# Patient Record
Sex: FEMALE | Race: WHITE | Employment: FULL TIME | URBAN - METROPOLITAN AREA
[De-identification: names, ages, dates, MRNs, and addresses within clinical notes are randomized per-mention and may not be internally consistent; named-entity substitution may affect disease eponyms.]

---

## 2020-02-17 ENCOUNTER — OFFICE VISIT (OUTPATIENT)
Dept: FAMILY MEDICINE CLINIC | Facility: CLINIC | Age: 30
End: 2020-02-17
Payer: COMMERCIAL

## 2020-02-17 VITALS
SYSTOLIC BLOOD PRESSURE: 117 MMHG | TEMPERATURE: 98 F | BODY MASS INDEX: 32.65 KG/M2 | DIASTOLIC BLOOD PRESSURE: 75 MMHG | HEART RATE: 83 BPM | RESPIRATION RATE: 16 BRPM | HEIGHT: 66.9 IN | WEIGHT: 208 LBS

## 2020-02-17 DIAGNOSIS — Z00.00 ROUTINE PHYSICAL EXAMINATION: Primary | ICD-10-CM

## 2020-02-17 PROCEDURE — 99385 PREV VISIT NEW AGE 18-39: CPT | Performed by: FAMILY MEDICINE

## 2020-02-17 NOTE — PROGRESS NOTES
HPI:    Patient ID: Ilda Ramirez is a 30year old female. Patient is here for routine physical exam. No acute issues. No significant chronic medical problems. Patient is requesting annual testing. Diet and exercise have been good.  Past medical hist content normal.              ASSESSMENT/PLAN:   Routine physical examination:  - Exam is unremarkable. Screening tests were discussed, and after discussion, will check lab work as below. Healthy diet, exercise, and weight were discussed.  To call if problem

## 2020-04-27 ENCOUNTER — TELEPHONE (OUTPATIENT)
Dept: FAMILY MEDICINE CLINIC | Facility: CLINIC | Age: 30
End: 2020-04-27

## 2020-04-27 NOTE — TELEPHONE ENCOUNTER
Please see patient's MyChart appointment below.     Appointment For: Madina Olmedo ([id number])   Visit Type: Timoteo uDbon (2068)      4/28/2020   10:50 AM  10 mins. Jodee Cortez MD        [facility]      Patient Comments:   I have what looks

## 2020-04-27 NOTE — TELEPHONE ENCOUNTER
Pt calling back to state her mom told her the cyst looks like there is something in it \"like a jacobo pimple looks\"  Advised to keep OV with Dr Mariia Chiu and pt agrees to plan

## 2020-04-27 NOTE — TELEPHONE ENCOUNTER
Spoke with pt who states has cyst on right shoulder blade for over a year.     Per pt this last week it has \"tripled in size and is red/painful\"    Pt states she had tele visit with outside provider last week who gave her keflex and told her to contact [facility]

## 2020-04-28 ENCOUNTER — OFFICE VISIT (OUTPATIENT)
Dept: FAMILY MEDICINE CLINIC | Facility: CLINIC | Age: 30
End: 2020-04-28
Payer: COMMERCIAL

## 2020-04-28 VITALS — BODY MASS INDEX: 31.71 KG/M2 | HEIGHT: 66.9 IN | TEMPERATURE: 98 F | WEIGHT: 202 LBS

## 2020-04-28 DIAGNOSIS — L72.3 SEBACEOUS CYST: Primary | ICD-10-CM

## 2020-04-28 PROCEDURE — 99213 OFFICE O/P EST LOW 20 MIN: CPT | Performed by: FAMILY MEDICINE

## 2020-04-28 RX ORDER — CEPHALEXIN 500 MG/1
CAPSULE ORAL
COMMUNITY
Start: 2020-04-23 | End: 2021-04-27

## 2020-04-28 NOTE — PROGRESS NOTES
HPI:    Patient ID: Vasu Bear is a 30year old female. Pt presents for follow up from telemedicine visit for cyst of the right posterior shoulder area. Was diagnosed with seb cyst. Patient is being treated with cephalexin.  Patient states symptom

## 2021-03-13 DIAGNOSIS — Z23 NEED FOR VACCINATION: ICD-10-CM

## 2021-04-27 ENCOUNTER — OFFICE VISIT (OUTPATIENT)
Dept: OBGYN CLINIC | Facility: CLINIC | Age: 31
End: 2021-04-27
Payer: COMMERCIAL

## 2021-04-27 VITALS — WEIGHT: 188 LBS | BODY MASS INDEX: 30 KG/M2 | SYSTOLIC BLOOD PRESSURE: 126 MMHG | DIASTOLIC BLOOD PRESSURE: 82 MMHG

## 2021-04-27 DIAGNOSIS — Z01.419 WELL WOMAN EXAM: Primary | ICD-10-CM

## 2021-04-27 DIAGNOSIS — N92.6 ENCOUNTER FOR MANAGEMENT OF MENSTRUAL ISSUE: ICD-10-CM

## 2021-04-27 DIAGNOSIS — Z30.09 COUNSELING FOR INITIATION OF BIRTH CONTROL METHOD: ICD-10-CM

## 2021-04-27 DIAGNOSIS — N89.8 VAGINAL DISCHARGE: ICD-10-CM

## 2021-04-27 PROCEDURE — 99385 PREV VISIT NEW AGE 18-39: CPT | Performed by: NURSE PRACTITIONER

## 2021-04-27 PROCEDURE — 87106 FUNGI IDENTIFICATION YEAST: CPT | Performed by: NURSE PRACTITIONER

## 2021-04-27 PROCEDURE — 3079F DIAST BP 80-89 MM HG: CPT | Performed by: NURSE PRACTITIONER

## 2021-04-27 PROCEDURE — 87808 TRICHOMONAS ASSAY W/OPTIC: CPT | Performed by: NURSE PRACTITIONER

## 2021-04-27 PROCEDURE — 3074F SYST BP LT 130 MM HG: CPT | Performed by: NURSE PRACTITIONER

## 2021-04-27 PROCEDURE — 87205 SMEAR GRAM STAIN: CPT | Performed by: NURSE PRACTITIONER

## 2021-04-27 RX ORDER — LEVONORGESTREL / ETHINYL ESTRADIOL AND ETHINYL ESTRADIOL 150-30(84)
1 KIT ORAL DAILY
Qty: 1 PACKAGE | Refills: 0 | Status: SHIPPED | OUTPATIENT
Start: 2021-04-27 | End: 2021-06-09 | Stop reason: ALTCHOICE

## 2021-04-27 NOTE — PROGRESS NOTES
[facility], [facility]  Obstetrics and Gynecology  Annual Gynecology Visit  Lilliana Bergeron, MSN, APRN, FNP-BC    HPI   Jae Siddiqui is a 26year old female who presents for her annual gynecology exam. Menses are regular, occur every 26-27 days.  Last for 3-4 da Yes      Comment: socially    Drug use: No      Exercise: three times per week. Diet: watches calories closely     REVIEW OF SYSTEMS   Review of Systems   Constitutional: Negative. HENT: Negative. Eyes: Negative. Respiratory: Negative.     Cardiov tenderness or fullness. Comments: Moderate amount of yellow discharge present, culture collected  Musculoskeletal:         General: Normal range of motion. Cervical back: Normal range of motion and neck supple.    Lymphadenopathy:      Cervical: colonoscopy, bone density) reviewed. - Labs - Pap/HPV, vaginal culture  - Imaging - NA    Patient verbalizes understanding and all questions answered. RTC annually and PRN.      ORDERS   Orders Placed This Encounter      Hpv Dna  High Risk , Thin Prep

## 2021-05-17 ENCOUNTER — PATIENT MESSAGE (OUTPATIENT)
Dept: FAMILY MEDICINE CLINIC | Facility: CLINIC | Age: 31
End: 2021-05-17

## 2021-05-17 DIAGNOSIS — L72.3 SEBACEOUS CYST: Primary | ICD-10-CM

## 2021-05-18 NOTE — TELEPHONE ENCOUNTER
Referral request noted. Referral approved, generated and sent to [facility].   Pt notified through [facility]

## 2021-05-18 NOTE — TELEPHONE ENCOUNTER
Jaci Cochran RN 5/17/2021 5:48 PM CDT      ----- Message -----  From: Alfredo Merino  Sent: 5/17/2021 5:22 PM CDT  To: Em Rn Triage  Subject: Other     I came to see you about a cyst on my back last year and you said if it came back I would need to exp

## 2021-06-09 ENCOUNTER — TELEPHONE (OUTPATIENT)
Dept: OBGYN CLINIC | Facility: CLINIC | Age: 31
End: 2021-06-09

## 2021-06-09 RX ORDER — NORGESTIMATE AND ETHINYL ESTRADIOL 0.25-0.035
1 KIT ORAL DAILY
Qty: 28 TABLET | Refills: 2 | Status: SHIPPED | OUTPATIENT
Start: 2021-06-09 | End: 2021-09-01

## 2021-06-09 NOTE — TELEPHONE ENCOUNTER
Spoke with pt about switching pills to Ortho Cyclen. To discontinue current OCPs, wait for a menstrual cycle, then begin new prescription. To follow up in 2-3 months for reevaluation of meds. Pt verbalizes understanding and all questions answered.

## 2021-09-01 ENCOUNTER — OFFICE VISIT (OUTPATIENT)
Dept: OBGYN CLINIC | Facility: CLINIC | Age: 31
End: 2021-09-01
Payer: COMMERCIAL

## 2021-09-01 VITALS — DIASTOLIC BLOOD PRESSURE: 80 MMHG | SYSTOLIC BLOOD PRESSURE: 109 MMHG | BODY MASS INDEX: 29 KG/M2 | WEIGHT: 187 LBS

## 2021-09-01 DIAGNOSIS — Z30.41 SURVEILLANCE FOR BIRTH CONTROL, ORAL CONTRACEPTIVES: Primary | ICD-10-CM

## 2021-09-01 PROCEDURE — 3079F DIAST BP 80-89 MM HG: CPT | Performed by: NURSE PRACTITIONER

## 2021-09-01 PROCEDURE — 99212 OFFICE O/P EST SF 10 MIN: CPT | Performed by: NURSE PRACTITIONER

## 2021-09-01 PROCEDURE — 3074F SYST BP LT 130 MM HG: CPT | Performed by: NURSE PRACTITIONER

## 2021-09-01 RX ORDER — NORGESTIMATE AND ETHINYL ESTRADIOL 0.25-0.035
1 KIT ORAL DAILY
Qty: 28 TABLET | Refills: 7 | Status: SHIPPED | OUTPATIENT
Start: 2021-09-01

## 2021-09-01 NOTE — PROGRESS NOTES
[facility]  Obstetrics and Gynecology  Focused Gynecology Problem Visit  Lynda Doll, MSN, APRN, FNP-BC    HPI   Shahzad George is a 26year old female who presents to the clinic for Follow - Up (on Birth Control )  .      Patient presents for lisa Other Topics      Concerns:        Caffeine Concern: Not Asked        Exercise: Not Asked        Seat Belt: Not Asked        Special Diet: Not Asked        Stress Concern: Not Asked        Weight Concern: Not Asked    Social History Narrative      Not on f Cyclen refilled to pharmacy until pt is due for her annual in April 2022  - Follow up annually and PRN    ORDERS   No orders of the defined types were placed in this encounter.       PRESCRIPTIONS     Requested Prescriptions     Signed Prescriptions Disp Re

## 2021-12-10 ENCOUNTER — HOSPITAL ENCOUNTER (OUTPATIENT)
Age: 31
Discharge: HOME | End: 2021-12-10
Payer: COMMERCIAL

## 2021-12-10 ENCOUNTER — APPOINTMENT (OUTPATIENT)
Dept: CT IMAGING | Age: 31
End: 2021-12-10
Attending: NURSE PRACTITIONER
Payer: COMMERCIAL

## 2021-12-10 ENCOUNTER — NURSE TRIAGE (OUTPATIENT)
Dept: FAMILY MEDICINE CLINIC | Facility: CLINIC | Age: 31
End: 2021-12-10

## 2021-12-10 VITALS
BODY MASS INDEX: 28.04 KG/M2 | DIASTOLIC BLOOD PRESSURE: 78 MMHG | WEIGHT: 185 LBS | HEIGHT: 68 IN | HEART RATE: 64 BPM | TEMPERATURE: 98 F | SYSTOLIC BLOOD PRESSURE: 131 MMHG | OXYGEN SATURATION: 100 % | RESPIRATION RATE: 16 BRPM

## 2021-12-10 DIAGNOSIS — R51.9 NONINTRACTABLE HEADACHE, UNSPECIFIED CHRONICITY PATTERN, UNSPECIFIED HEADACHE TYPE: Primary | ICD-10-CM

## 2021-12-10 PROCEDURE — 80047 BASIC METABLC PNL IONIZED CA: CPT | Performed by: NURSE PRACTITIONER

## 2021-12-10 PROCEDURE — 70450 CT HEAD/BRAIN W/O DYE: CPT | Performed by: NURSE PRACTITIONER

## 2021-12-10 PROCEDURE — 85025 COMPLETE CBC W/AUTO DIFF WBC: CPT | Performed by: NURSE PRACTITIONER

## 2021-12-10 PROCEDURE — 81025 URINE PREGNANCY TEST: CPT | Performed by: NURSE PRACTITIONER

## 2021-12-10 PROCEDURE — 99204 OFFICE O/P NEW MOD 45 MIN: CPT | Performed by: NURSE PRACTITIONER

## 2021-12-10 NOTE — TELEPHONE ENCOUNTER
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

## 2021-12-10 NOTE — ED INITIAL ASSESSMENT (HPI)
Pt here for evaluation after episode of dizziness and blurred vision while at work around 12pm. Pt states had \"floater in left eye\" and then vision got blurry and she felt she was unable to focus.  Pt states now dizziness is improved and vision is back to

## 2021-12-12 NOTE — ED PROVIDER NOTES
Patient presents with:  Dizziness      HPI:     Liana Rehman is a 33year old female who presents for an episode at work this morning of dizziness, headache, and floaters in both eyes. She states her left eye was worse than her right.   She states the Asked        Weight Concern: Not Asked    Social History Narrative      Not on file    Social Determinants of Health  Financial Resource Strain: Not on file  Food Insecurity: Not on file  Transportation Needs: Not on file  Physical Activity: Not on file  S and she felt she was unable to focus. Pt states now dizziness               is improved and vision is back to normal but now also having frontal HA               and intermittent L hand tingling. Denies N/V. States pain at 4/10.  Pt is               awake a unspecified chronicity pattern, unspecified headache type  R51.9        All results reviewed and discussed with patient. See AVS for detailed discharge instructions for your condition today.     Follow Up with:  Matt Dorado MD  [address] [place] [address]

## 2021-12-13 ENCOUNTER — OFFICE VISIT (OUTPATIENT)
Dept: FAMILY MEDICINE CLINIC | Facility: CLINIC | Age: 31
End: 2021-12-13
Payer: COMMERCIAL

## 2021-12-13 VITALS
HEIGHT: 67 IN | HEART RATE: 69 BPM | DIASTOLIC BLOOD PRESSURE: 73 MMHG | RESPIRATION RATE: 116 BRPM | WEIGHT: 190 LBS | TEMPERATURE: 97 F | BODY MASS INDEX: 29.82 KG/M2 | SYSTOLIC BLOOD PRESSURE: 128 MMHG

## 2021-12-13 DIAGNOSIS — F41.9 ANXIETY: ICD-10-CM

## 2021-12-13 DIAGNOSIS — R51.9 HEADACHE DISORDER: Primary | ICD-10-CM

## 2021-12-13 PROCEDURE — 3078F DIAST BP <80 MM HG: CPT | Performed by: FAMILY MEDICINE

## 2021-12-13 PROCEDURE — 3008F BODY MASS INDEX DOCD: CPT | Performed by: FAMILY MEDICINE

## 2021-12-13 PROCEDURE — 99213 OFFICE O/P EST LOW 20 MIN: CPT | Performed by: FAMILY MEDICINE

## 2021-12-13 PROCEDURE — 3074F SYST BP LT 130 MM HG: CPT | Performed by: FAMILY MEDICINE

## 2021-12-13 RX ORDER — MINOCYCLINE HYDROCHLORIDE 100 MG/1
1 CAPSULE ORAL AS DIRECTED
COMMUNITY
Start: 2021-05-26 | End: 2021-12-13

## 2021-12-13 RX ORDER — ALPRAZOLAM 0.25 MG/1
0.25 TABLET ORAL
Qty: 30 TABLET | Refills: 0 | Status: SHIPPED | OUTPATIENT
Start: 2021-12-13

## 2021-12-13 NOTE — PROGRESS NOTES
Subjective:   Patient ID: Kayla Snyder is a 33year old female. Pt presents for follow up from the urgent care. Had a CT scan and blood work done which was unremarkable. Pt originally had some blurred vision.    Pt has had sig stress and anxiety and Tension / migraine headaches:  - After discussion with patient, to monitor for symptoms and call if any significant symptoms; Consider follow up with neurology if recurs    Anxiety:  - After discussion, will start alprazolam as needed; discussed benefits a

## 2022-01-20 ENCOUNTER — TELEMEDICINE (OUTPATIENT)
Dept: FAMILY MEDICINE CLINIC | Facility: CLINIC | Age: 32
End: 2022-01-20

## 2022-01-20 ENCOUNTER — TELEPHONE (OUTPATIENT)
Dept: FAMILY MEDICINE CLINIC | Facility: CLINIC | Age: 32
End: 2022-01-20

## 2022-01-20 DIAGNOSIS — R05.9 COUGH: ICD-10-CM

## 2022-01-20 DIAGNOSIS — U07.1 COVID: ICD-10-CM

## 2022-01-20 PROCEDURE — 99213 OFFICE O/P EST LOW 20 MIN: CPT | Performed by: FAMILY MEDICINE

## 2022-01-20 RX ORDER — CODEINE PHOSPHATE AND GUAIFENESIN 10; 100 MG/5ML; MG/5ML
5 SOLUTION ORAL EVERY 6 HOURS PRN
Qty: 140 ML | Refills: 0 | Status: SHIPPED | OUTPATIENT
Start: 2022-01-20

## 2022-01-20 NOTE — TELEPHONE ENCOUNTER
Spoke with patient, states that she sent a [employer] message again in response to her inquiry. Infection banner updated.      Future Appointments   Date Time Provider Slick Virgen   1/20/2022  3:50 PM Elizabeth Clifton MD [facility]       From: Walker Estrada

## 2022-01-20 NOTE — PROGRESS NOTES
Subjective:   Patient ID: Chantelle Pettit is a 33year old female. This visit is conducted using Telemedicine with live, interactive video and audio during this Coronavirus pandemic.     Please note that the following visit was completed using two-way, Medication Sig Dispense Refill   • guaiFENesin-codeine (CHERATUSSIN AC) 100-10 MG/5ML Oral Solution Take 5 mL by mouth every 6 (six) hours as needed for cough. Medication may causes sedation. Not to operate heavy machinery or drive on medication.  140 mL

## 2023-03-06 ENCOUNTER — LAB ENCOUNTER (OUTPATIENT)
Dept: LAB | Age: 33
End: 2023-03-06
Attending: FAMILY MEDICINE
Payer: COMMERCIAL

## 2023-03-06 ENCOUNTER — OFFICE VISIT (OUTPATIENT)
Dept: FAMILY MEDICINE CLINIC | Facility: CLINIC | Age: 33
End: 2023-03-06

## 2023-03-06 VITALS
TEMPERATURE: 98 F | HEART RATE: 55 BPM | SYSTOLIC BLOOD PRESSURE: 103 MMHG | DIASTOLIC BLOOD PRESSURE: 68 MMHG | BODY MASS INDEX: 29.51 KG/M2 | WEIGHT: 188 LBS | HEIGHT: 67 IN | RESPIRATION RATE: 16 BRPM

## 2023-03-06 DIAGNOSIS — Z00.00 ROUTINE PHYSICAL EXAMINATION: ICD-10-CM

## 2023-03-06 DIAGNOSIS — F41.9 ANXIETY: ICD-10-CM

## 2023-03-06 DIAGNOSIS — Z00.00 ROUTINE PHYSICAL EXAMINATION: Primary | ICD-10-CM

## 2023-03-06 LAB
ALBUMIN SERPL-MCNC: 3.9 G/DL (ref 3.4–5)
ALBUMIN/GLOB SERPL: 1.2 {RATIO} (ref 1–2)
ALP LIVER SERPL-CCNC: 64 U/L
ALT SERPL-CCNC: 18 U/L
ANION GAP SERPL CALC-SCNC: 6 MMOL/L (ref 0–18)
AST SERPL-CCNC: 15 U/L (ref 15–37)
BILIRUB SERPL-MCNC: 0.5 MG/DL (ref 0.1–2)
BUN BLD-MCNC: 10 MG/DL (ref 7–18)
BUN/CREAT SERPL: 13.5 (ref 10–20)
CALCIUM BLD-MCNC: 9.2 MG/DL (ref 8.5–10.1)
CHLORIDE SERPL-SCNC: 109 MMOL/L (ref 98–112)
CHOLEST SERPL-MCNC: 167 MG/DL (ref ?–200)
CO2 SERPL-SCNC: 25 MMOL/L (ref 21–32)
CREAT BLD-MCNC: 0.74 MG/DL
DEPRECATED RDW RBC AUTO: 39.1 FL (ref 35.1–46.3)
ERYTHROCYTE [DISTWIDTH] IN BLOOD BY AUTOMATED COUNT: 12.1 % (ref 11–15)
FASTING PATIENT LIPID ANSWER: YES
FASTING STATUS PATIENT QL REPORTED: YES
GFR SERPLBLD BASED ON 1.73 SQ M-ARVRAT: 109 ML/MIN/1.73M2 (ref 60–?)
GLOBULIN PLAS-MCNC: 3.2 G/DL (ref 2.8–4.4)
GLUCOSE BLD-MCNC: 85 MG/DL (ref 70–99)
HCT VFR BLD AUTO: 39.3 %
HDLC SERPL-MCNC: 67 MG/DL (ref 40–59)
HGB BLD-MCNC: 13.3 G/DL
LDLC SERPL CALC-MCNC: 90 MG/DL (ref ?–100)
MCH RBC QN AUTO: 29.8 PG (ref 26–34)
MCHC RBC AUTO-ENTMCNC: 33.8 G/DL (ref 31–37)
MCV RBC AUTO: 87.9 FL
NONHDLC SERPL-MCNC: 100 MG/DL (ref ?–130)
OSMOLALITY SERPL CALC.SUM OF ELEC: 288 MOSM/KG (ref 275–295)
PLATELET # BLD AUTO: 452 10(3)UL (ref 150–450)
POTASSIUM SERPL-SCNC: 4.2 MMOL/L (ref 3.5–5.1)
PROT SERPL-MCNC: 7.1 G/DL (ref 6.4–8.2)
RBC # BLD AUTO: 4.47 X10(6)UL
SODIUM SERPL-SCNC: 140 MMOL/L (ref 136–145)
TRIGL SERPL-MCNC: 49 MG/DL (ref 30–149)
TSI SER-ACNC: 1.96 MIU/ML (ref 0.36–3.74)
VLDLC SERPL CALC-MCNC: 8 MG/DL (ref 0–30)
WBC # BLD AUTO: 6.9 X10(3) UL (ref 4–11)

## 2023-03-06 PROCEDURE — 80061 LIPID PANEL: CPT

## 2023-03-06 PROCEDURE — 99395 PREV VISIT EST AGE 18-39: CPT | Performed by: FAMILY MEDICINE

## 2023-03-06 PROCEDURE — 3078F DIAST BP <80 MM HG: CPT | Performed by: FAMILY MEDICINE

## 2023-03-06 PROCEDURE — 84443 ASSAY THYROID STIM HORMONE: CPT

## 2023-03-06 PROCEDURE — 80053 COMPREHEN METABOLIC PANEL: CPT

## 2023-03-06 PROCEDURE — 3008F BODY MASS INDEX DOCD: CPT | Performed by: FAMILY MEDICINE

## 2023-03-06 PROCEDURE — 36415 COLL VENOUS BLD VENIPUNCTURE: CPT

## 2023-03-06 PROCEDURE — 3074F SYST BP LT 130 MM HG: CPT | Performed by: FAMILY MEDICINE

## 2023-03-06 PROCEDURE — 85027 COMPLETE CBC AUTOMATED: CPT

## 2023-03-06 RX ORDER — ALPRAZOLAM 0.25 MG/1
0.25 TABLET ORAL
Qty: 30 TABLET | Refills: 1 | Status: SHIPPED | OUTPATIENT
Start: 2023-03-06

## 2023-03-07 ENCOUNTER — OFFICE VISIT (OUTPATIENT)
Dept: PODIATRY CLINIC | Facility: CLINIC | Age: 33
End: 2023-03-07

## 2023-03-07 DIAGNOSIS — L60.3 NAIL DYSTROPHY: Primary | ICD-10-CM

## 2023-03-07 PROCEDURE — 99203 OFFICE O/P NEW LOW 30 MIN: CPT | Performed by: STUDENT IN AN ORGANIZED HEALTH CARE EDUCATION/TRAINING PROGRAM

## 2023-03-07 PROCEDURE — 11755 BIOPSY NAIL UNIT: CPT | Performed by: STUDENT IN AN ORGANIZED HEALTH CARE EDUCATION/TRAINING PROGRAM

## 2023-04-07 ENCOUNTER — TELEPHONE (OUTPATIENT)
Dept: PODIATRY CLINIC | Facility: CLINIC | Age: 33
End: 2023-04-07

## 2023-04-07 DIAGNOSIS — L60.3 NAIL DYSTROPHY: Primary | ICD-10-CM

## 2023-04-07 RX ORDER — CLOTRIMAZOLE 1 G/ML
1 SOLUTION TOPICAL 2 TIMES DAILY
Qty: 60 ML | Refills: 3 | Status: SHIPPED | OUTPATIENT
Start: 2023-04-07

## 2023-04-07 NOTE — TELEPHONE ENCOUNTER
Called pt to discuss fungal biopsy results   Discussed tx options with pt again in detail    Reviewed treatment options for nail dystrophy / onychomycosis including:   No treatment and monitoring, topical meds, oral meds, nail removal and laser treatment. Advantages and disadvantages of each reviewed. Using oral agents would require regular   blood test monitoring due to risk of hepatotoxicity and other adverse reactions including drug interactions. Topical meds are much safer yet carry very low efficacy. Pt has opted for topical solution at this time.

## 2023-05-05 ENCOUNTER — NURSE TRIAGE (OUTPATIENT)
Dept: FAMILY MEDICINE CLINIC | Facility: CLINIC | Age: 33
End: 2023-05-05

## 2023-07-12 RX ORDER — ESCITALOPRAM OXALATE 10 MG/1
10 TABLET ORAL DAILY
Qty: 90 TABLET | Refills: 3 | Status: SHIPPED | OUTPATIENT
Start: 2023-07-12

## 2023-07-12 NOTE — TELEPHONE ENCOUNTER
Refill passed per [facility] protocol.     Requested Prescriptions   Pending Prescriptions Disp Refills    ESCITALOPRAM 10 MG Oral Tab [Pharmacy Med Name: Escitalopram Oxalate 10 Mg Tab Auro] 30 tablet 0     Sig: TAKE ONE TABLET BY MOUTH ONE TIME DAILY       Psychiatric Non-Scheduled (Anti-Anxiety) Passed - 7/12/2023  1:31 AM        Passed - In person appointment or virtual visit in the past 6 mos or appointment in next 3 mos     Recent Outpatient Visits              2 months ago [address], Rita Wong MD    Office Visit    4 months ago Nail dystrophy    [facility], [address] [place] [address],[address], [place], [place]    Office Visit    4 months ago Routine physical examination    Jimmie Price MD    Office Visit    1 year ago Ileana Engel MD    Telemedicine    1 year ago Headache disorder    [address] [place] [address], Rita Wong MD    Office Visit                           Recent Outpatient Visits              2 months ago Ileana Engel MD    Office Visit    4 months ago Nail dystrophy    [facility], [address] [place] [address],[address], [place], [place]    Office Visit    4 months ago Routine physical examination    Jimmie Price MD    Office Visit    1 year ago Ileana Engel MD    Telemedicine    1 year ago Headache disorder    Jimmie Price MD    Office Visit

## 2023-10-16 ENCOUNTER — OFFICE VISIT (OUTPATIENT)
Dept: OBGYN CLINIC | Facility: CLINIC | Age: 33
End: 2023-10-16
Payer: COMMERCIAL

## 2023-10-16 VITALS — HEIGHT: 67 IN | SYSTOLIC BLOOD PRESSURE: 112 MMHG | BODY MASS INDEX: 30 KG/M2 | DIASTOLIC BLOOD PRESSURE: 72 MMHG

## 2023-10-16 DIAGNOSIS — N94.89 HIGH-TONE PELVIC FLOOR DYSFUNCTION: ICD-10-CM

## 2023-10-16 DIAGNOSIS — N94.6 DYSMENORRHEA: Primary | ICD-10-CM

## 2023-10-16 PROBLEM — N92.6 ENCOUNTER FOR MANAGEMENT OF MENSTRUAL ISSUE: Status: RESOLVED | Noted: 2021-04-27 | Resolved: 2023-10-16

## 2023-10-16 PROBLEM — N89.8 VAGINAL DISCHARGE: Status: RESOLVED | Noted: 2021-04-27 | Resolved: 2023-10-16

## 2023-10-16 RX ORDER — LEVONORGESTREL / ETHINYL ESTRADIOL AND ETHINYL ESTRADIOL 150-30(84)
1 KIT ORAL DAILY
Qty: 91 TABLET | Refills: 3 | Status: SHIPPED | OUTPATIENT
Start: 2023-10-16 | End: 2024-10-15

## 2023-10-16 RX ORDER — ESCITALOPRAM OXALATE 5 MG/1
5 TABLET ORAL DAILY
COMMUNITY
Start: 2023-06-27

## 2023-10-16 NOTE — PROGRESS NOTES
New Patient GYN History and Physical  [facility]    CHIEF COMPLAINT:  Patient presents with:  Menstrual Problem: Pt states menstrual cycles have got worse, hip pain, painful cramps, heavier flow. HISTORY OF PRESENT ILLNESS:   Jovon Bishop is a 27year old female [place]  who presents for    Worsened periods    Patient's last menstrual period was 10/06/2023 (exact date). Monthly / regular, spotting 1-2 days before periods, have gotten heavier and carmps have gotten a lot worse. Always gets periods poops (loose with cramping before), and those feel worse also. Hips have started to hurt also now. Ibuprofen helps sometimes (600 mg) not helping much anymore. No sex currently. Has been a while since had sex. Is interested in possible future fertility. Last pap smear  normal hpv neg - had abnormal pap  with HPV but that went away. No h/o STI  Did have BV last year. PAST MEDICAL HISTORY:   Past Medical History:   Diagnosis Date    Psoriasis         PAST SURGICAL HISTORY:   Past Surgical History:   Procedure Laterality Date    Patient denies any surgical history          PAST OB HISTORY:  OB History    Para Term  AB Living   0 0 0 0 0 0   SAB IAB Ectopic Multiple Live Births   0 0 0 0 0       CURRENT MEDICATIONS:      Current Outpatient Medications:     escitalopram 5 MG Oral Tab, Take 1 tablet (5 mg total) by mouth daily. , Disp: , Rfl:     betamethasone 0.1 % External Cream, Apply 1 Application topically 2 (two) times daily. , Disp: 15 g, Rfl: 1    Levonorgest-Eth Estrad -Day (Karen Lingmaykel) 0.15-0.03 &0.01 MG Oral Tab, Take 1 tablet by mouth daily. , Disp: 91 tablet, Rfl: 3    escitalopram 10 MG Oral Tab, Take 1 tablet (10 mg total) by mouth daily. , Disp: 90 tablet, Rfl: 3    ALPRAZolam (XANAX) 0.25 MG Oral Tab, Take 1 tablet (0.25 mg total) by mouth daily as needed for Anxiety. To take once per day as needed for anxiety.  Can cause sedation/ fatigue., Disp: 30 tablet, Rfl: 1    ALLERGIES:    Sulfa Antibiotics       RASH    Comment:Other reaction(s): Unknown    SOCIAL HISTORY:  Social History    Socioeconomic History      Marital status:     Tobacco Use      Smoking status: Never      Smokeless tobacco: Never    Vaping Use      Vaping Use: Never used    Substance and Sexual Activity      Alcohol use: Yes        Comment: socially - 1-2 times per week. Drug use: No      Sexual activity: Not Currently    Other Topics      Concerns:        Blood Transfusions: No      FAMILY HISTORY:  Family History   Problem Relation Age of Onset    Diabetes Father     Lipids Father     Cancer Father         skin ca    Other (perforated bowel from hernia) Mother     Other (ovarian dermoid) Mother     Other (fibroids) Sister     Other (fibroids) Sister      ASSESSMENTS:  PHYSICAL EXAM:   Patient's last menstrual period was 10/06/2023 (exact date). 10/16/23  1634   BP: 112/72   Height: 67\"     CONSTITUTIONAL: Awake, alert, cooperative, no apparent distress, and appears stated age   NECK: Supple, symmetrical, trachea midline, no adenopathy, thyroid symmetric, not enlarged and no tenderness  LUNGS: No excess work of breathing  ABDOMEN: Soft, non-distended, non-tender, no masses palpated    GENITAL/URINARY:    External Genitalia:  General appearance; normal, Hair distribution; normal, Lesions absent   Urethral Meatus:  Lesions absent, Prolapse absent  Bladder:  Tenderness absent, Cystocele absent  Vagina:  Discharge absent, Lesions absent, Pelvic support normal; Strength 4/5 with poor relaxation, pain on left OI with abduction  Cervix:  Lesions absent, Discharge absent, Tenderness absent  Uterus:  Size normal, Masses absent, Tenderness absent  Adnexa: Masses absent, Tenderness absent  Anus/Perineum:  Lesions absent    MUSCULOSKELETAL: There is no redness, warmth, or swelling of the joints. Tone is normal.  NEUROLOGIC: Patient is awake, alert and oriented to name, place and time.  Casual gait is normal.  SKIN: no bruising or bleeding and no rashes  PSYCHIATRIC: Behavior:  Appropriate  Mood:  appropriate  ASSESSMENT AND PLAN:  1. Dysmenorrhea  - high tone pelvic floor dsfunction, no other findings on PE. US to eval structures. - will trial OCPs did well on seasonique in the past, rx sent. - Also sent rx for topical steroids for seasonal vulvar psoriasis. - Pelvic US Complete [facility] Only [[id number]]; Future  - Physical Therapy Referral - External    Orders Placed This Encounter      Levonorgest-Eth Estrad 91-Day (Doneen Juanis) 0.15-0.03 &0.01 MG Oral Tab      2.  High-tone pelvic floor dysfunction  - Physical Therapy Referral - External         follow up after pelvic US or as needed  Pham Galvin, DO

## 2023-10-28 ENCOUNTER — OFFICE VISIT (OUTPATIENT)
Dept: FAMILY MEDICINE CLINIC | Facility: CLINIC | Age: 33
End: 2023-10-28

## 2023-10-28 VITALS
BODY MASS INDEX: 33.8 KG/M2 | SYSTOLIC BLOOD PRESSURE: 104 MMHG | WEIGHT: 215.38 LBS | DIASTOLIC BLOOD PRESSURE: 67 MMHG | HEART RATE: 64 BPM | HEIGHT: 67 IN

## 2023-10-28 DIAGNOSIS — M25.50 ARTHRALGIA, UNSPECIFIED JOINT: ICD-10-CM

## 2023-10-28 DIAGNOSIS — Z87.2 HISTORY OF PSORIASIS: Primary | ICD-10-CM

## 2023-10-28 PROCEDURE — 99213 OFFICE O/P EST LOW 20 MIN: CPT | Performed by: FAMILY MEDICINE

## 2023-10-28 PROCEDURE — 3074F SYST BP LT 130 MM HG: CPT | Performed by: FAMILY MEDICINE

## 2023-10-28 PROCEDURE — 3078F DIAST BP <80 MM HG: CPT | Performed by: FAMILY MEDICINE

## 2023-10-28 PROCEDURE — 3008F BODY MASS INDEX DOCD: CPT | Performed by: FAMILY MEDICINE

## 2023-10-28 NOTE — PROGRESS NOTES
Subjective:   Patient ID: Char Hunt is a 27year old female. Pt presents with various pains of her hips, back, and ankles . Pt was doing PT and working with chiropractor which helped in past. Family xx of psoriatic arthritis. Pt has pains of the lateral aspect of ankle and posterior hip areas. History/Other:   Review of Systems   Constitutional:  Negative for fever. Musculoskeletal:  Positive for arthralgias. Current Outpatient Medications   Medication Sig Dispense Refill    escitalopram 5 MG Oral Tab Take 1 tablet (5 mg total) by mouth daily. Levonorgest-Eth Estrad 91-Day (SEASONIQUE) 0.15-0.03 &0.01 MG Oral Tab Take 1 tablet by mouth daily. 91 tablet 3    escitalopram 10 MG Oral Tab Take 1 tablet (10 mg total) by mouth daily. 90 tablet 3    ALPRAZolam (XANAX) 0.25 MG Oral Tab Take 1 tablet (0.25 mg total) by mouth daily as needed for Anxiety. To take once per day as needed for anxiety. Can cause sedation/ fatigue. 30 tablet 1    betamethasone 0.1 % External Cream Apply 1 Application topically 2 (two) times daily. 15 g 1     Allergies:  Sulfa Antibiotics       RASH    Comment:Other reaction(s): Unknown    Objective:   Physical Exam  Constitutional:       Appearance: Normal appearance. Musculoskeletal:      Lumbar back: Normal. No spasms, tenderness or bony tenderness. Negative right straight leg raise test and negative left straight leg raise test.      Right hip: Normal. No deformity. Normal range of motion. Left hip: Normal. No deformity. Normal range of motion. Right ankle: Normal. No swelling or deformity. No tenderness. Normal range of motion. Left ankle: Normal. No swelling or ecchymosis. No tenderness. Normal range of motion. Neurological:      Mental Status: She is alert. Assessment & Plan:   History of psoriasis /Arthralgia, unspecified joint:  - After discussion, will send to rheumatology for further evaluation and treatment;  To call if any significant symptoms. Discussed therapy and chiropractic care. No orders of the defined types were placed in this encounter.       Meds This Visit:  Requested Prescriptions      No prescriptions requested or ordered in this encounter       Imaging & Referrals:  RHEUMATOLOGY - INTERNAL

## 2023-11-08 ENCOUNTER — ULTRASOUND ENCOUNTER (OUTPATIENT)
Dept: OBGYN CLINIC | Facility: CLINIC | Age: 33
End: 2023-11-08
Payer: COMMERCIAL

## 2023-11-08 DIAGNOSIS — N94.6 DYSMENORRHEA: ICD-10-CM

## 2023-11-14 ENCOUNTER — TELEPHONE (OUTPATIENT)
Dept: OBGYN CLINIC | Facility: CLINIC | Age: 33
End: 2023-11-14

## 2023-11-14 NOTE — TELEPHONE ENCOUNTER
Pt is calling requesting to speak with a RN in regards to her ultrasound test results. Please assist.

## 2023-11-16 ENCOUNTER — TELEPHONE (OUTPATIENT)
Dept: OBGYN CLINIC | Facility: CLINIC | Age: 33
End: 2023-11-16

## 2023-11-16 NOTE — TELEPHONE ENCOUNTER
The patient called and stated that she has an appointment scheduled to discuss her results but she would like a sooner appointment because she is stressing about the results. She would like a call from the nurse or doctor about it.

## 2023-11-16 NOTE — TELEPHONE ENCOUNTER
Called pt concerned with message left by RD in regards to needing surgery and having ovary removed. Pt started to cry, reassured pt, this RN will send message but RD not in the office today. If RD response, will call pt back. Pt agrees. Manas Anton, DO  11/15/2023  6:26 PM CST       Left message for patient to review US results showing large dermoid cyst in left ovary. Will also send [facility] to patient.

## 2023-11-17 NOTE — TELEPHONE ENCOUNTER
Edis Singh, DO  P [facility] Clinical Staff  Hi,  Can you please help Katlyn Salomónovi schedule a telephone visit sometime early next week? Thank you!  ~RD    Called pt and scheduled telephone visit for 11/29/2023. Pt agree.

## 2023-11-17 NOTE — TELEPHONE ENCOUNTER
Left voicemail with patient to discuss results. I recommend she schedule a telephone appointment so we have dedicated time to talk to each other.     Emigdio Salamanca, DO

## 2023-11-30 ENCOUNTER — HOSPITAL ENCOUNTER (OUTPATIENT)
Dept: GENERAL RADIOLOGY | Facility: HOSPITAL | Age: 33
Discharge: HOME | End: 2023-11-30
Attending: INTERNAL MEDICINE
Payer: COMMERCIAL

## 2023-11-30 ENCOUNTER — OFFICE VISIT (OUTPATIENT)
Dept: RHEUMATOLOGY | Facility: CLINIC | Age: 33
End: 2023-11-30
Payer: COMMERCIAL

## 2023-11-30 ENCOUNTER — LAB ENCOUNTER (OUTPATIENT)
Dept: LAB | Facility: HOSPITAL | Age: 33
End: 2023-11-30
Attending: INTERNAL MEDICINE
Payer: COMMERCIAL

## 2023-11-30 ENCOUNTER — TELEPHONE (OUTPATIENT)
Dept: OBGYN CLINIC | Facility: CLINIC | Age: 33
End: 2023-11-30

## 2023-11-30 VITALS
DIASTOLIC BLOOD PRESSURE: 79 MMHG | BODY MASS INDEX: 34.37 KG/M2 | HEIGHT: 67 IN | WEIGHT: 219 LBS | HEART RATE: 70 BPM | SYSTOLIC BLOOD PRESSURE: 113 MMHG

## 2023-11-30 DIAGNOSIS — L40.9 PSORIASIS: ICD-10-CM

## 2023-11-30 DIAGNOSIS — Z01.818 PRE-OP TESTING: ICD-10-CM

## 2023-11-30 DIAGNOSIS — M19.90 INFLAMMATORY ARTHRITIS: ICD-10-CM

## 2023-11-30 DIAGNOSIS — Z83.2 FAMILY HISTORY OF AUTOIMMUNE DISORDER: ICD-10-CM

## 2023-11-30 DIAGNOSIS — Z51.81 THERAPEUTIC DRUG MONITORING: ICD-10-CM

## 2023-11-30 DIAGNOSIS — M19.90 INFLAMMATORY ARTHRITIS: Primary | ICD-10-CM

## 2023-11-30 DIAGNOSIS — D27.1 DERMOID CYST OF LEFT OVARY: Primary | ICD-10-CM

## 2023-11-30 LAB
ALBUMIN SERPL-MCNC: 4.4 G/DL (ref 3.2–4.8)
ALBUMIN/GLOB SERPL: 1.6 {RATIO} (ref 1–2)
ALP LIVER SERPL-CCNC: 72 U/L
ALT SERPL-CCNC: 19 U/L
ANION GAP SERPL CALC-SCNC: 7 MMOL/L (ref 0–18)
AST SERPL-CCNC: 23 U/L (ref ?–34)
BASOPHILS # BLD AUTO: 0.02 X10(3) UL (ref 0–0.2)
BASOPHILS NFR BLD AUTO: 0.3 %
BILIRUB SERPL-MCNC: 0.4 MG/DL (ref 0.3–1.2)
BUN BLD-MCNC: 9 MG/DL (ref 9–23)
BUN/CREAT SERPL: 12.5 (ref 10–20)
C3 SERPL-MCNC: 103 MG/DL (ref 84–160)
C4 SERPL-MCNC: 28.3 MG/DL (ref 12–36)
CALCIUM BLD-MCNC: 9.4 MG/DL (ref 8.7–10.4)
CHLORIDE SERPL-SCNC: 105 MMOL/L (ref 98–112)
CO2 SERPL-SCNC: 27 MMOL/L (ref 21–32)
CREAT BLD-MCNC: 0.72 MG/DL
CRP SERPL-MCNC: <0.4 MG/DL (ref ?–1)
DEPRECATED RDW RBC AUTO: 39.9 FL (ref 35.1–46.3)
EGFRCR SERPLBLD CKD-EPI 2021: 112 ML/MIN/1.73M2 (ref 60–?)
EOSINOPHIL # BLD AUTO: 0.09 X10(3) UL (ref 0–0.7)
EOSINOPHIL NFR BLD AUTO: 1.3 %
ERYTHROCYTE [DISTWIDTH] IN BLOOD BY AUTOMATED COUNT: 12.6 % (ref 11–15)
ERYTHROCYTE [SEDIMENTATION RATE] IN BLOOD: 5 MM/HR
FASTING STATUS PATIENT QL REPORTED: YES
GLOBULIN PLAS-MCNC: 2.8 G/DL (ref 2.8–4.4)
GLUCOSE BLD-MCNC: 80 MG/DL (ref 70–99)
HAV IGM SER QL: NONREACTIVE
HBV CORE IGM SER QL: NONREACTIVE
HBV SURFACE AG SERPL QL IA: NONREACTIVE
HCT VFR BLD AUTO: 40.8 %
HCV AB SERPL QL IA: NONREACTIVE
HGB BLD-MCNC: 13.4 G/DL
IMM GRANULOCYTES # BLD AUTO: 0.02 X10(3) UL (ref 0–1)
IMM GRANULOCYTES NFR BLD: 0.3 %
LYMPHOCYTES # BLD AUTO: 1.94 X10(3) UL (ref 1–4)
LYMPHOCYTES NFR BLD AUTO: 27 %
MCH RBC QN AUTO: 28.6 PG (ref 26–34)
MCHC RBC AUTO-ENTMCNC: 32.8 G/DL (ref 31–37)
MCV RBC AUTO: 87 FL
MONOCYTES # BLD AUTO: 0.44 X10(3) UL (ref 0.1–1)
MONOCYTES NFR BLD AUTO: 6.1 %
NEUTROPHILS # BLD AUTO: 4.68 X10 (3) UL (ref 1.5–7.7)
NEUTROPHILS # BLD AUTO: 4.68 X10(3) UL (ref 1.5–7.7)
NEUTROPHILS NFR BLD AUTO: 65 %
OSMOLALITY SERPL CALC.SUM OF ELEC: 286 MOSM/KG (ref 275–295)
PLATELET # BLD AUTO: 389 10(3)UL (ref 150–450)
POTASSIUM SERPL-SCNC: 4.5 MMOL/L (ref 3.5–5.1)
PROT SERPL-MCNC: 7.2 G/DL (ref 5.7–8.2)
RBC # BLD AUTO: 4.69 X10(6)UL
RHEUMATOID FACT SERPL-ACNC: <10 IU/ML (ref ?–14)
SODIUM SERPL-SCNC: 139 MMOL/L (ref 136–145)
T4 FREE SERPL-MCNC: 1.1 NG/DL (ref 0.8–1.7)
TSI SER-ACNC: 2.38 MIU/ML (ref 0.55–4.78)
WBC # BLD AUTO: 7.2 X10(3) UL (ref 4–11)

## 2023-11-30 PROCEDURE — 86225 DNA ANTIBODY NATIVE: CPT

## 2023-11-30 PROCEDURE — 86140 C-REACTIVE PROTEIN: CPT

## 2023-11-30 PROCEDURE — 84439 ASSAY OF FREE THYROXINE: CPT

## 2023-11-30 PROCEDURE — 72110 X-RAY EXAM L-2 SPINE 4/>VWS: CPT | Performed by: INTERNAL MEDICINE

## 2023-11-30 PROCEDURE — 72202 X-RAY EXAM SI JOINTS 3/> VWS: CPT | Performed by: INTERNAL MEDICINE

## 2023-11-30 PROCEDURE — 3078F DIAST BP <80 MM HG: CPT | Performed by: INTERNAL MEDICINE

## 2023-11-30 PROCEDURE — 36415 COLL VENOUS BLD VENIPUNCTURE: CPT

## 2023-11-30 PROCEDURE — 86200 CCP ANTIBODY: CPT | Performed by: INTERNAL MEDICINE

## 2023-11-30 PROCEDURE — 73565 X-RAY EXAM OF KNEES: CPT | Performed by: INTERNAL MEDICINE

## 2023-11-30 PROCEDURE — 85652 RBC SED RATE AUTOMATED: CPT

## 2023-11-30 PROCEDURE — 80053 COMPREHEN METABOLIC PANEL: CPT

## 2023-11-30 PROCEDURE — 85025 COMPLETE CBC W/AUTO DIFF WBC: CPT

## 2023-11-30 PROCEDURE — 3074F SYST BP LT 130 MM HG: CPT | Performed by: INTERNAL MEDICINE

## 2023-11-30 PROCEDURE — 99244 OFF/OP CNSLTJ NEW/EST MOD 40: CPT | Performed by: INTERNAL MEDICINE

## 2023-11-30 PROCEDURE — 80074 ACUTE HEPATITIS PANEL: CPT

## 2023-11-30 PROCEDURE — 86160 COMPLEMENT ANTIGEN: CPT

## 2023-11-30 PROCEDURE — 84443 ASSAY THYROID STIM HORMONE: CPT

## 2023-11-30 PROCEDURE — 86038 ANTINUCLEAR ANTIBODIES: CPT | Performed by: INTERNAL MEDICINE

## 2023-11-30 PROCEDURE — 3008F BODY MASS INDEX DOCD: CPT | Performed by: INTERNAL MEDICINE

## 2023-11-30 PROCEDURE — 86431 RHEUMATOID FACTOR QUANT: CPT | Performed by: INTERNAL MEDICINE

## 2023-11-30 PROCEDURE — 81374 HLA I TYPING 1 ANTIGEN LR: CPT

## 2023-11-30 RX ORDER — MELOXICAM 15 MG/1
15 TABLET ORAL
Qty: 30 TABLET | Refills: 2 | Status: SHIPPED | OUTPATIENT
Start: 2023-11-30

## 2023-11-30 RX ORDER — PANTOPRAZOLE SODIUM 20 MG/1
40 TABLET, DELAYED RELEASE ORAL DAILY PRN
Qty: 30 TABLET | Refills: 2 | Status: SHIPPED | OUTPATIENT
Start: 2023-11-30

## 2023-11-30 NOTE — TELEPHONE ENCOUNTER
----- Message from Ibis Whitley DO sent at 11/29/2023  7:04 PM CST -----  Regarding: pls schedule surgery  Hi,  Please schedule the following surgery:    Procedure: laparoscopic left ovarian cystectomy, possible oophorectomy, possible laparotomy  Assist: georgina Schmid  Date: 1/24/24                               Time Requested: to follow my other case  Dx: dermoid cyst of left ovary  Pre-op appt: na  Admission type: outpatient  Department of discharge(SDS/Floor): [facility]  Expected length of stay: na  Procedure length time (please enter amount you are requesting): 1 hour  Recovery time (patients always ask): 1-2 weeks  Medical Clearance: (Y/N) no  Post- Op f/u appt time frame: 2 weeks post-op    Thank you!   ~RD

## 2023-11-30 NOTE — PROGRESS NOTES
[address] [facility] PATIENT    Bipin Munoz is a 26year old female. ASSESSMENT/PLAN:       ICD-10-CM    1. Inflammatory arthritis  M19.90 HLA B 27 Disease Association     TSH and Free T4     CBC With Differential With Platelet     Anti-Nuclear Antibody (KETURAH) by IFA, Reflex Titer + Specific Antibodies     Anti-dsDNA Antibody, IgG     Comp Metabolic Panel (14)     Complement C3, Serum     Complement C4, Serum     C-Reactive Protein     Hepatitis Panel, Acute (4)     Cyclic Citrullinate Pep. IGG     Rheumatoid Arthritis Factor     Sed Rate, Westergren (Automated)     TSH and Free T4     XR SACROILIAC JOINTS (MIN 3 VIEWS) (CPT=72202)     XR LUMBAR SPINE (MIN 4 VIEWS) (CPT=72110)     XR KNEE (3 VIEWS), LEFT (CPT=73562)     XR KNEE (1 OR 2 VIEWS), RIGHT (CPT=73560)     XR KNEE BILAT STANDING (CPT=73565)      2. Psoriasis  L40.9 HLA B 27 Disease Association     TSH and Free T4     CBC With Differential With Platelet     Anti-Nuclear Antibody (KETURAH) by IFA, Reflex Titer + Specific Antibodies     Anti-dsDNA Antibody, IgG     Comp Metabolic Panel (14)     Complement C3, Serum     Complement C4, Serum     C-Reactive Protein     Hepatitis Panel, Acute (4)     Cyclic Citrullinate Pep. IGG     Rheumatoid Arthritis Factor     Sed Rate, Westergren (Automated)     TSH and Free T4     XR SACROILIAC JOINTS (MIN 3 VIEWS) (CPT=72202)     XR LUMBAR SPINE (MIN 4 VIEWS) (CPT=72110)     XR KNEE (3 VIEWS), LEFT (CPT=73562)     XR KNEE (1 OR 2 VIEWS), RIGHT (CPT=73560)     XR KNEE BILAT STANDING (CPT=73565)      3. Family history of autoimmune disorder  Z83.2 HLA B 27 Disease Association     TSH and Free T4     CBC With Differential With Platelet     Anti-Nuclear Antibody (KETURAH) by IFA, Reflex Titer + Specific Antibodies     Anti-dsDNA Antibody, IgG     Comp Metabolic Panel (14)     Complement C3, Serum     Complement C4, Serum     C-Reactive Protein     Hepatitis Panel, Acute (4)     Cyclic Citrullinate Pep.  IGG     Rheumatoid Arthritis Factor     Sed Rate, Westergren (Automated)     TSH and Free T4     XR SACROILIAC JOINTS (MIN 3 VIEWS) (CPT=72202)     XR LUMBAR SPINE (MIN 4 VIEWS) (CPT=72110)     XR KNEE (3 VIEWS), LEFT (CPT=73562)     XR KNEE (1 OR 2 VIEWS), RIGHT (CPT=73560)     XR KNEE BILAT STANDING (CPT=73565)      4. Therapeutic drug monitoring  Z51.81 HLA B 27 Disease Association     TSH and Free T4     CBC With Differential With Platelet     Anti-Nuclear Antibody (KETURAH) by IFA, Reflex Titer + Specific Antibodies     Anti-dsDNA Antibody, IgG     Comp Metabolic Panel (14)     Complement C3, Serum     Complement C4, Serum     C-Reactive Protein     Hepatitis Panel, Acute (4)     Cyclic Citrullinate Pep. IGG     Rheumatoid Arthritis Factor     Sed Rate, Westergren (Automated)     TSH and Free T4          DISCUSSION:  Patient presents as a new outpatient referral for predominantly axial polyarthralgia starting about a few months prior. Family history notable for a maternal grandmother with this psoriatic arthritis and the patient has known cutaneous psoriasis. 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 Therefore, will order further workup as above. Will trial scheduled NSAIDs and monitor patient response. PLAN:  - Patient to obtain above non-- fasting lab work  - Will check lumbar spine, SI joint, and knee XR's  - Start Mobic 15 mg daily with food. Will also send a PPI       -Patient to contact office if symptoms persist after 2 weeks of Mobic and will consider modification to a different NSAID at that time    Pt will f/u in 1 month with imaging and labs completed prior.     Leslee Childs DO  11/30/2023   9:07 AM    HPI:     Chief Complaint   Patient presents with    Hip Pain    Heel Pain              I had the pleasure of seeing Zackary Carrillojohanne on 11/30/2023 as a new outpatient consultation for polyarthralgias. The patient was originally referred by PCP,Dr. Gauri Goodman. 26year old female w/ PMH cutaneous psoriasis who presents to clinic for the first time. Patient reports noticing back, hip, heel (around the Achilles tendon) pain starting about a few months prior. She admits to back pain in the past (around 2019) which improved with physical therapy, however, she states that these arthralgias feel different and are now more persistent. ROS also positive for AM stiffness for at least an hour, scalp psoriasis (worse in the winter and with stress), sensitivity to sun, dry eyes, brittle nails but no fever, chills, chest pain, shortness of breath, abdominal pain, Raynaud's phenomenon, oral/nasal ulcers. Family history notable for a maternal grandmother with psoriatic arthritis. HISTORY:  Past Medical History:   Diagnosis Date    Psoriasis       Social Hx Reviewed   Family Hx Reviewed     Medications (Active prior to today's visit):  Current Outpatient Medications   Medication Sig Dispense Refill    escitalopram 5 MG Oral Tab Take 1 tablet (5 mg total) by mouth daily. escitalopram 10 MG Oral Tab Take 1 tablet (10 mg total) by mouth daily. 90 tablet 3    ALPRAZolam (XANAX) 0.25 MG Oral Tab Take 1 tablet (0.25 mg total) by mouth daily as needed for Anxiety. To take once per day as needed for anxiety. Can cause sedation/ fatigue. 30 tablet 1    ibuprofen 600 MG Oral Tab Take 1 tablet (600 mg total) by mouth every 6 (six) hours as needed for Pain. (Patient not taking: Reported on 11/30/2023) 60 tablet 0    gabapentin 300 MG Oral Cap Take 1 capsule (300 mg total) by mouth every 8 (eight) hours as needed (moderate surgical pain). (Patient not taking: Reported on 11/30/2023) 20 capsule 0    betamethasone 0.1 % External Cream Apply 1 Application topically 2 (two) times daily. 15 g 1    Levonorgest-Eth Estrad 91-Day (SEASONIQUE) 0.15-0.03 &0.01 MG Oral Tab Take 1 tablet by mouth daily.  (Patient not taking: Reported on 11/30/2023) 91 tablet 3       Allergies: Allergies   Allergen Reactions    Sulfa Antibiotics RASH     Other reaction(s): Unknown         ROS:   Review of Systems   Constitutional:  Negative for chills and fever. HENT:  Negative for congestion, hearing loss, mouth sores, nosebleeds and trouble swallowing. Eyes:  Negative for photophobia, pain, redness and visual disturbance. Respiratory:  Negative for cough and shortness of breath. Cardiovascular:  Negative for chest pain, palpitations and leg swelling. Gastrointestinal:  Negative for abdominal pain, blood in stool, diarrhea and nausea. Endocrine: Negative for cold intolerance and heat intolerance. Genitourinary:  Negative for dysuria, frequency and hematuria. Musculoskeletal:  Positive for arthralgias, back pain, gait problem and joint swelling. Negative for myalgias, neck pain and neck stiffness. Skin:  Positive for rash (psoriasis). Negative for color change. Neurological:  Negative for dizziness, weakness, numbness and headaches. Psychiatric/Behavioral:  Negative for confusion and dysphoric mood. PHYSICAL EXAM:     Constitutional:  Well developed, Well nourished, No acute distress  HENT:  Normocephalic, Atraumatic, Bilateral external ears normal, Oropharynx moist, No oral exudates. Neck: Normal range of motion, No tenderness, Supple, No stridor. Eyes:  PERRL, EOMI, Conjunctiva normal, No discharge. Respiratory:  Normal breath sounds, No respiratory distress, No wheezing. Cardiovascular:  Normal heart rate, Normal rhythm, No murmurs, No rubs, No gallops. GI:  Bowel sounds normal, Soft, No tenderness, No masses, No pulsatile masses. : No CVA tenderness. Musculoskeletal: Positive bilateral Guido Chill testing. A comprehensive 28 count joint exam was done and was negative for swelling or tenderness except as noted.  Inspections for misalignment, asymmetry, crepitation, defects, tenderness, masses, nodules, effusions, range of motion, and stability in the upper and lower extremities bilaterally are all normal unless noted. Integument:  Warm, Dry, No erythema, No rash including no current psoriatic plaques  Lymphatic:  No lymphadenopathy noted. Neurologic:  Alert & oriented x 3, Normal motor function, Normal sensory function, No focal deficits noted.   Psychiatric:  Affect normal, Judgment normal, Mood normal.      SYLWIA  LABS:   Prior lab work reviewed and notable for:    3/7/2023  CBC without cytopenias  CMP with normal renal and hepatic function  TSH 1.960 WNL    Imaging:     N/A

## 2023-12-02 LAB
CCP IGG SERPL-ACNC: 1 U/ML (ref 0–6.9)
DSDNA IGG SERPL IA-ACNC: <0.6 IU/ML

## 2023-12-04 LAB — NUCLEAR IGG TITR SER IF: NEGATIVE {TITER}

## 2023-12-08 LAB — HLA-B27: NEGATIVE

## 2024-01-21 ENCOUNTER — LAB ENCOUNTER (OUTPATIENT)
Dept: LAB | Facility: HOSPITAL | Age: 34
End: 2024-01-21
Attending: OBSTETRICS & GYNECOLOGY
Payer: COMMERCIAL

## 2024-01-21 DIAGNOSIS — Z01.818 PRE-OP TESTING: ICD-10-CM

## 2024-01-21 DIAGNOSIS — D27.1 DERMOID CYST OF LEFT OVARY: ICD-10-CM

## 2024-01-21 LAB
ANTIBODY SCREEN: NEGATIVE
BASOPHILS # BLD AUTO: 0.03 X10(3) UL (ref 0–0.2)
BASOPHILS NFR BLD AUTO: 0.4 %
DEPRECATED RDW RBC AUTO: 39.9 FL (ref 35.1–46.3)
EOSINOPHIL # BLD AUTO: 0.08 X10(3) UL (ref 0–0.7)
EOSINOPHIL NFR BLD AUTO: 1.2 %
ERYTHROCYTE [DISTWIDTH] IN BLOOD BY AUTOMATED COUNT: 12.5 % (ref 11–15)
HCT VFR BLD AUTO: 40.5 %
HGB BLD-MCNC: 13.9 G/DL
IMM GRANULOCYTES # BLD AUTO: 0.01 X10(3) UL (ref 0–1)
IMM GRANULOCYTES NFR BLD: 0.1 %
LYMPHOCYTES # BLD AUTO: 2 X10(3) UL (ref 1–4)
LYMPHOCYTES NFR BLD AUTO: 29.4 %
MCH RBC QN AUTO: 30 PG (ref 26–34)
MCHC RBC AUTO-ENTMCNC: 34.3 G/DL (ref 31–37)
MCV RBC AUTO: 87.3 FL
MONOCYTES # BLD AUTO: 0.41 X10(3) UL (ref 0.1–1)
MONOCYTES NFR BLD AUTO: 6 %
NEUTROPHILS # BLD AUTO: 4.28 X10 (3) UL (ref 1.5–7.7)
NEUTROPHILS # BLD AUTO: 4.28 X10(3) UL (ref 1.5–7.7)
NEUTROPHILS NFR BLD AUTO: 62.9 %
PLATELET # BLD AUTO: 366 10(3)UL (ref 150–450)
RBC # BLD AUTO: 4.64 X10(6)UL
RH BLOOD TYPE: POSITIVE
RH BLOOD TYPE: POSITIVE
WBC # BLD AUTO: 6.8 X10(3) UL (ref 4–11)

## 2024-01-21 PROCEDURE — 86850 RBC ANTIBODY SCREEN: CPT

## 2024-01-21 PROCEDURE — 36415 COLL VENOUS BLD VENIPUNCTURE: CPT

## 2024-01-21 PROCEDURE — 86901 BLOOD TYPING SEROLOGIC RH(D): CPT

## 2024-01-21 PROCEDURE — 86900 BLOOD TYPING SEROLOGIC ABO: CPT

## 2024-01-21 PROCEDURE — 85025 COMPLETE CBC W/AUTO DIFF WBC: CPT

## 2024-01-22 NOTE — DISCHARGE INSTRUCTIONS
Post Operative Home Care Instructions     We hope you were pleased with your care at [facility].  We wish you the best outcome and overall experience.  These instructions will help to minimize pain, limit the risk for an infection, and improve the likelihood of a successful recovery.    What to Expect:  Abdominal cramping   Vaginal bleeding for about 1-2 weeks   Constipation is common after surgery. Please keep up with Colace twice per day and Miralax as needed.     Over-The-Counter Medication  Non-prescription anti-inflammatory medications can also help to ease the pain.  You may take Aleve, Tylenol or Ibuprofen   Colace or Metamucil for Constipation  Drink a full glass of water with oral medication and take as directed.    Bathing/Showers  You may resume showers  No baths, swimming, hot tubs for at least 8 weeks.     Home Medication  Resume your home medications as instructed    Diet   Resume your normal diet    Activity  Refrain from vaginal intercourse, vaginal suppositories, tampon use or douches until after your follow up visit   No exercising for 1-2 weeks  You may climb stairs     Return to Work or School  You may return to work in a few days   Contact your gynecologist's office, if you need a medical release. ([phone])    Driving  Avoid driving if taking narcotics    Follow-up Appointment with Your Obstetrician  Call your Gynecologist's office today for a staple removal/incision check appointment and/or follow up appointment.   The number is [phone].  Verify your appointment date, day, time, and location.  At your office visit:  Your progress will be evaluated, pathology will be reviewed, and any additional concerns and instructions will be discussed.    Questions or Concerns  Call your gynecologist's office if you experience the following:  Severe pain not controlled by pain medication  Foul smelling vaginal discharge  Heavy bleeding  Shortness of breath  Fever  Crying and periods  of sadness that prevents you from caring for yourself   Burning sensation during urination or inability to urinate  Swelling, redness or abnormal warmth to your leg/calf  Please call [phone]. If your call is made after office hours, a physician will be available to help you.  There is always a provider covering our patients.    Thank you for coming to [facility] for your surgery.  The nurses, gynecologist, and the anesthesiologists try very hard to make sure you receive the best care possible.  Your trust in them as well as us is greatly appreciated.    Thanks so much,   The Providers of [facility]        HOME INSTRUCTIONS  [facility]: POST-OP ANESTHESIA  The medication that you received for sedation or general anesthesia can last up to 24 hours. Your judgment and reflexes may be altered, even if you feel like your normal self.      We Recommend:   Do not drive any motor vehicle or bicycle   Avoid mowing the lawn, playing sports, or working with power tools/applicances (power saws, electric knives or mixers)   That you have someone stay with you on your first night home   Do not drink alcohol or take sleeping pills or tranquilizers   Do not sign legal documents within 24 hours of your procedure   If you had a nerve block for your surgery, take extra care not to put any pressure on your arm or hand for 24 hours    It is normal:  For you to have a sore throat if you had a breathing tube during surgery (while you were asleep!). The sore throat should get better within 48 hours. You can gargle with warm salt water (1/2 tsp in 4 oz warm water) or use a throat lozenge for comfort  To feel muscle aches or soreness especially in the abdomen, chest or neck. The achy feeling should go away in the next 24 hours  To feel weak, sleepy or \"wiped out\". Your should start feeling better in the next 24 hours.   To experience mild discomforts such as sore lip or tongue,  headache, cramps, gas pains or a bloated feeling in your abdomen.   To experience mild back pain or soreness for a day or two if you had spinal or epidural anesthesia.   If you had laparoscopic surgery, to feel shoulder pain or discomfort on the day of surgery.   For some patients to have nausea after surgery/anesthesia    If you feel nausea or experience vomiting:   Try to move around less.   Eat less than usual or drink only liquids until the next morning   Nausea should resolve in about 24 hours    If you have a problem when you are at home:    Call your surgeons office   Discharge Instructions: After Your Surgery  You’ve just had surgery. During surgery, you were given medicine called anesthesia to keep you relaxed and free of pain. After surgery, you may have some pain or nausea. This is common. Here are some tips for feeling better and getting well after surgery.   Going home  Your healthcare provider will show you how to take care of yourself when you go home. They'll also answer your questions. Have an adult family member or friend drive you home. For the first 24 hours after your surgery:   Don't drive or use heavy equipment.  Don't make important decisions or sign legal papers.  Take medicines as directed.  Don't drink alcohol.  Have someone stay with you, if needed. They can watch for problems and help keep you safe.  Be sure to go to all follow-up visits with your healthcare provider. And rest after your surgery for as long as your provider tells you to.   Coping with pain  If you have pain after surgery, pain medicine will help you feel better. Take it as directed, before pain becomes severe. Also, ask your healthcare provider or pharmacist about other ways to control pain. This might be with heat, ice, or relaxation. And follow any other instructions your surgeon or nurse gives you.      Stay on schedule with your medicine.     Tips for taking pain medicine  To get the best relief possible, remember  these points:   Pain medicines can upset your stomach. Taking them with a little food may help.  Most pain relievers taken by mouth need at least 20 to 30 minutes to start to work.  Don't wait till your pain becomes severe before you take your medicine. Try to time your medicine so that you can take it before starting an activity. This might be before you get dressed, go for a walk, or sit down for dinner.  Constipation is a common side effect of some pain medicines. Call your healthcare provider before taking any medicines such as laxatives or stool softeners to help ease constipation. Also ask if you should skip any foods. Drinking lots of fluids and eating foods such as fruits and vegetables that are high in fiber can also help. Remember, don't take laxatives unless your surgeon has prescribed them.  Drinking alcohol and taking pain medicine can cause dizziness and slow your breathing. It can even be deadly. Don't drink alcohol while taking pain medicine.  Pain medicine can make you react more slowly to things. Don't drive or run machinery while taking pain medicine.  Your healthcare provider may tell you to take acetaminophen to help ease your pain. Ask them how much you're supposed to take each day. Acetaminophen or other pain relievers may interact with your prescription medicines or other over-the-counter (OTC) medicines. Some prescription medicines have acetaminophen and other ingredients in them. Using both prescription and OTC acetaminophen for pain can cause you to accidentally overdose. Read the labels on your OTC medicines with care. This will help you to clearly know the list of ingredients, how much to take, and any warnings. It may also help you not take too much acetaminophen. If you have questions or don't understand the information, ask your pharmacist or healthcare provider to explain it to you before you take the OTC medicine.   Managing nausea  Some people have an upset stomach (nausea) after  surgery. This is often because of anesthesia, pain, or pain medicine, less movement of food in the stomach, or the stress of surgery. These tips will help you handle nausea and eat healthy foods as you get better. If you were on a special food plan before surgery, ask your healthcare provider if you should follow it while you get better. Check with your provider on how your eating should progress. It may depend on the surgery you had. These general tips may help:   Don't push yourself to eat. Your body will tell you when to eat and how much.  Start off with clear liquids and soup. They're easier to digest.  Next try semi-solid foods as you feel ready. These include mashed potatoes, applesauce, and gelatin.  Slowly move to solid foods. Don’t eat fatty, rich, or spicy foods at first.  Don't force yourself to have 3 large meals a day. Instead eat smaller amounts more often.  Take pain medicines with a small amount of solid food, such as crackers or toast. This helps prevent nausea.  When to call your healthcare provider  Call your healthcare provider right away if you have any of these:   You still have too much pain, or the pain gets worse, after taking the medicine. The medicine may not be strong enough. Or there may be a complication from the surgery.  You feel too sleepy, dizzy, or groggy. The medicine may be too strong.  Side effects such as nausea or vomiting. Your healthcare provider may advise taking other medicines to .  Skin changes such as rash, itching, or hives. This may mean you have an allergic reaction. Your provider may advise taking other medicines.  The incision looks different (for instance, part of it opens up).  Bleeding or fluid leaking from the incision site, and weren't told to expect that.  Fever of 100.4°F (38°C) or higher, or as directed by your provider.  Call 911  Call 911 right away if you have:   Trouble breathing  Facial swelling    If you have obstructive sleep apnea   You were given  anesthesia medicine during surgery to keep you comfortable and free of pain. After surgery, you may have more apnea spells because of this medicine and other medicines you were given. The spells may last longer than normal.    At home:  Keep using the continuous positive airway pressure (CPAP) device when you sleep. Unless your healthcare provider tells you not to, use it when you sleep, day or night. CPAP is a common device used to treat obstructive sleep apnea.  Talk with your provider before taking any pain medicine, muscle relaxants, or sedatives. Your provider will tell you about the possible dangers of taking these medicines.  Contact your provider if your sleeping changes a lot even when taking medicines as directed.  [employer] last reviewed this educational content on 10/1/2021  © 3417-9963 [employer]. All rights reserved. This information is not intended as a substitute for professional medical care. Always follow your healthcare professional's instructions.

## 2024-01-24 ENCOUNTER — HOSPITAL ENCOUNTER (OUTPATIENT)
Facility: HOSPITAL | Age: 34
Setting detail: HOSPITAL OUTPATIENT SURGERY
Discharge: HOME | End: 2024-01-24
Attending: OBSTETRICS & GYNECOLOGY | Admitting: OBSTETRICS & GYNECOLOGY
Payer: COMMERCIAL

## 2024-01-24 ENCOUNTER — ANESTHESIA (OUTPATIENT)
Dept: SURGERY | Facility: HOSPITAL | Age: 34
End: 2024-01-24
Payer: COMMERCIAL

## 2024-01-24 ENCOUNTER — ANESTHESIA EVENT (OUTPATIENT)
Dept: SURGERY | Facility: HOSPITAL | Age: 34
End: 2024-01-24
Payer: COMMERCIAL

## 2024-01-24 VITALS
HEIGHT: 68 IN | WEIGHT: 221 LBS | DIASTOLIC BLOOD PRESSURE: 58 MMHG | HEART RATE: 53 BPM | TEMPERATURE: 98 F | RESPIRATION RATE: 16 BRPM | BODY MASS INDEX: 33.49 KG/M2 | OXYGEN SATURATION: 99 % | SYSTOLIC BLOOD PRESSURE: 115 MMHG

## 2024-01-24 DIAGNOSIS — D27.1 DERMOID CYST OF LEFT OVARY: ICD-10-CM

## 2024-01-24 PROBLEM — N83.209 SIMPLE OVARIAN CYST: Status: ACTIVE | Noted: 2024-01-24

## 2024-01-24 LAB — B-HCG UR QL: NEGATIVE

## 2024-01-24 PROCEDURE — 0UT64ZZ RESECTION OF LEFT FALLOPIAN TUBE, PERCUTANEOUS ENDOSCOPIC APPROACH: ICD-10-PCS | Performed by: OBSTETRICS & GYNECOLOGY

## 2024-01-24 PROCEDURE — 58662 LAPAROSCOPY EXCISE LESIONS: CPT | Performed by: OBSTETRICS & GYNECOLOGY

## 2024-01-24 PROCEDURE — 0UT14ZZ RESECTION OF LEFT OVARY, PERCUTANEOUS ENDOSCOPIC APPROACH: ICD-10-PCS | Performed by: OBSTETRICS & GYNECOLOGY

## 2024-01-24 PROCEDURE — 0U504ZZ DESTRUCTION OF RIGHT OVARY, PERCUTANEOUS ENDOSCOPIC APPROACH: ICD-10-PCS | Performed by: OBSTETRICS & GYNECOLOGY

## 2024-01-24 PROCEDURE — 58661 LAPAROSCOPY REMOVE ADNEXA: CPT | Performed by: OBSTETRICS & GYNECOLOGY

## 2024-01-24 RX ORDER — METOCLOPRAMIDE HYDROCHLORIDE 5 MG/ML
10 INJECTION INTRAMUSCULAR; INTRAVENOUS ONCE
Status: DISCONTINUED | OUTPATIENT
Start: 2024-01-24 | End: 2024-01-24 | Stop reason: HOSPADM

## 2024-01-24 RX ORDER — HYDROMORPHONE HYDROCHLORIDE 1 MG/ML
0.4 INJECTION, SOLUTION INTRAMUSCULAR; INTRAVENOUS; SUBCUTANEOUS EVERY 5 MIN PRN
Status: DISCONTINUED | OUTPATIENT
Start: 2024-01-24 | End: 2024-01-24

## 2024-01-24 RX ORDER — SODIUM CHLORIDE 9 MG/ML
INJECTION, SOLUTION INTRAVENOUS CONTINUOUS PRN
Status: DISCONTINUED | OUTPATIENT
Start: 2024-01-24 | End: 2024-01-24 | Stop reason: SURG

## 2024-01-24 RX ORDER — SODIUM CHLORIDE, SODIUM LACTATE, POTASSIUM CHLORIDE, CALCIUM CHLORIDE 600; 310; 30; 20 MG/100ML; MG/100ML; MG/100ML; MG/100ML
INJECTION, SOLUTION INTRAVENOUS CONTINUOUS
Status: DISCONTINUED | OUTPATIENT
Start: 2024-01-24 | End: 2024-01-24

## 2024-01-24 RX ORDER — NALOXONE HYDROCHLORIDE 0.4 MG/ML
80 INJECTION, SOLUTION INTRAMUSCULAR; INTRAVENOUS; SUBCUTANEOUS AS NEEDED
Status: DISCONTINUED | OUTPATIENT
Start: 2024-01-24 | End: 2024-01-24

## 2024-01-24 RX ORDER — BUPIVACAINE HYDROCHLORIDE 2.5 MG/ML
INJECTION, SOLUTION EPIDURAL; INFILTRATION; INTRACAUDAL AS NEEDED
Status: DISCONTINUED | OUTPATIENT
Start: 2024-01-24 | End: 2024-01-24 | Stop reason: HOSPADM

## 2024-01-24 RX ORDER — GABAPENTIN 300 MG/1
300 CAPSULE ORAL ONCE AS NEEDED
Status: COMPLETED | OUTPATIENT
Start: 2024-01-24 | End: 2024-01-24

## 2024-01-24 RX ORDER — METOCLOPRAMIDE 10 MG/1
10 TABLET ORAL ONCE
Status: DISCONTINUED | OUTPATIENT
Start: 2024-01-24 | End: 2024-01-24 | Stop reason: HOSPADM

## 2024-01-24 RX ORDER — FAMOTIDINE 20 MG/1
20 TABLET, FILM COATED ORAL ONCE
Status: COMPLETED | OUTPATIENT
Start: 2024-01-24 | End: 2024-01-24

## 2024-01-24 RX ORDER — HYDROCODONE BITARTRATE AND ACETAMINOPHEN 5; 325 MG/1; MG/1
2 TABLET ORAL ONCE AS NEEDED
Status: DISCONTINUED | OUTPATIENT
Start: 2024-01-24 | End: 2024-01-24

## 2024-01-24 RX ORDER — MORPHINE SULFATE 4 MG/ML
2 INJECTION, SOLUTION INTRAMUSCULAR; INTRAVENOUS EVERY 10 MIN PRN
Status: DISCONTINUED | OUTPATIENT
Start: 2024-01-24 | End: 2024-01-24

## 2024-01-24 RX ORDER — FAMOTIDINE 10 MG/ML
20 INJECTION, SOLUTION INTRAVENOUS ONCE
Status: COMPLETED | OUTPATIENT
Start: 2024-01-24 | End: 2024-01-24

## 2024-01-24 RX ORDER — ONDANSETRON 4 MG/1
4 TABLET, FILM COATED ORAL EVERY 8 HOURS PRN
Status: DISCONTINUED | OUTPATIENT
Start: 2024-01-24 | End: 2024-01-24

## 2024-01-24 RX ORDER — ONDANSETRON 2 MG/ML
INJECTION INTRAMUSCULAR; INTRAVENOUS AS NEEDED
Status: DISCONTINUED | OUTPATIENT
Start: 2024-01-24 | End: 2024-01-24 | Stop reason: SURG

## 2024-01-24 RX ORDER — HYDROMORPHONE HYDROCHLORIDE 1 MG/ML
0.6 INJECTION, SOLUTION INTRAMUSCULAR; INTRAVENOUS; SUBCUTANEOUS EVERY 5 MIN PRN
Status: DISCONTINUED | OUTPATIENT
Start: 2024-01-24 | End: 2024-01-24

## 2024-01-24 RX ORDER — LIDOCAINE HYDROCHLORIDE 10 MG/ML
INJECTION, SOLUTION EPIDURAL; INFILTRATION; INTRACAUDAL; PERINEURAL AS NEEDED
Status: DISCONTINUED | OUTPATIENT
Start: 2024-01-24 | End: 2024-01-24 | Stop reason: SURG

## 2024-01-24 RX ORDER — ACETAMINOPHEN 500 MG
1000 TABLET ORAL ONCE AS NEEDED
Status: DISCONTINUED | OUTPATIENT
Start: 2024-01-24 | End: 2024-01-24

## 2024-01-24 RX ORDER — ONDANSETRON 2 MG/ML
4 INJECTION INTRAMUSCULAR; INTRAVENOUS EVERY 8 HOURS PRN
Status: DISCONTINUED | OUTPATIENT
Start: 2024-01-24 | End: 2024-01-24

## 2024-01-24 RX ORDER — ACETAMINOPHEN 500 MG
1000 TABLET ORAL ONCE
Status: COMPLETED | OUTPATIENT
Start: 2024-01-24 | End: 2024-01-24

## 2024-01-24 RX ORDER — MORPHINE SULFATE 10 MG/ML
6 INJECTION, SOLUTION INTRAMUSCULAR; INTRAVENOUS EVERY 10 MIN PRN
Status: DISCONTINUED | OUTPATIENT
Start: 2024-01-24 | End: 2024-01-24

## 2024-01-24 RX ORDER — GLYCOPYRROLATE 0.2 MG/ML
INJECTION, SOLUTION INTRAMUSCULAR; INTRAVENOUS AS NEEDED
Status: DISCONTINUED | OUTPATIENT
Start: 2024-01-24 | End: 2024-01-24 | Stop reason: SURG

## 2024-01-24 RX ORDER — ONDANSETRON 2 MG/ML
4 INJECTION INTRAMUSCULAR; INTRAVENOUS EVERY 6 HOURS PRN
Status: DISCONTINUED | OUTPATIENT
Start: 2024-01-24 | End: 2024-01-24

## 2024-01-24 RX ORDER — HYDROCODONE BITARTRATE AND ACETAMINOPHEN 5; 325 MG/1; MG/1
1 TABLET ORAL ONCE AS NEEDED
Status: DISCONTINUED | OUTPATIENT
Start: 2024-01-24 | End: 2024-01-24

## 2024-01-24 RX ORDER — HYDROMORPHONE HYDROCHLORIDE 1 MG/ML
0.2 INJECTION, SOLUTION INTRAMUSCULAR; INTRAVENOUS; SUBCUTANEOUS EVERY 5 MIN PRN
Status: DISCONTINUED | OUTPATIENT
Start: 2024-01-24 | End: 2024-01-24

## 2024-01-24 RX ORDER — MORPHINE SULFATE 4 MG/ML
4 INJECTION, SOLUTION INTRAMUSCULAR; INTRAVENOUS EVERY 10 MIN PRN
Status: DISCONTINUED | OUTPATIENT
Start: 2024-01-24 | End: 2024-01-24

## 2024-01-24 RX ORDER — ROCURONIUM BROMIDE 10 MG/ML
INJECTION, SOLUTION INTRAVENOUS AS NEEDED
Status: DISCONTINUED | OUTPATIENT
Start: 2024-01-24 | End: 2024-01-24 | Stop reason: SURG

## 2024-01-24 RX ORDER — DEXAMETHASONE SODIUM PHOSPHATE 4 MG/ML
VIAL (ML) INJECTION AS NEEDED
Status: DISCONTINUED | OUTPATIENT
Start: 2024-01-24 | End: 2024-01-24 | Stop reason: SURG

## 2024-01-24 RX ORDER — MIDAZOLAM HYDROCHLORIDE 1 MG/ML
INJECTION INTRAMUSCULAR; INTRAVENOUS AS NEEDED
Status: DISCONTINUED | OUTPATIENT
Start: 2024-01-24 | End: 2024-01-24 | Stop reason: SURG

## 2024-01-24 RX ORDER — KETOROLAC TROMETHAMINE 30 MG/ML
INJECTION, SOLUTION INTRAMUSCULAR; INTRAVENOUS AS NEEDED
Status: DISCONTINUED | OUTPATIENT
Start: 2024-01-24 | End: 2024-01-24 | Stop reason: SURG

## 2024-01-24 RX ORDER — PROCHLORPERAZINE EDISYLATE 5 MG/ML
5 INJECTION INTRAMUSCULAR; INTRAVENOUS EVERY 8 HOURS PRN
Status: DISCONTINUED | OUTPATIENT
Start: 2024-01-24 | End: 2024-01-24

## 2024-01-24 RX ADMIN — KETOROLAC TROMETHAMINE 30 MG: 30 INJECTION, SOLUTION INTRAMUSCULAR; INTRAVENOUS at 14:48:00

## 2024-01-24 RX ADMIN — SODIUM CHLORIDE, SODIUM LACTATE, POTASSIUM CHLORIDE, CALCIUM CHLORIDE: 600; 310; 30; 20 INJECTION, SOLUTION INTRAVENOUS at 14:47:00

## 2024-01-24 RX ADMIN — DEXAMETHASONE SODIUM PHOSPHATE 4 MG: 4 MG/ML VIAL (ML) INJECTION at 13:58:00

## 2024-01-24 RX ADMIN — ONDANSETRON 4 MG: 2 INJECTION INTRAMUSCULAR; INTRAVENOUS at 14:30:00

## 2024-01-24 RX ADMIN — GLYCOPYRROLATE 0.1 MG: 0.2 INJECTION, SOLUTION INTRAMUSCULAR; INTRAVENOUS at 14:15:00

## 2024-01-24 RX ADMIN — GLYCOPYRROLATE 0.1 MG: 0.2 INJECTION, SOLUTION INTRAMUSCULAR; INTRAVENOUS at 14:24:00

## 2024-01-24 RX ADMIN — SODIUM CHLORIDE, SODIUM LACTATE, POTASSIUM CHLORIDE, CALCIUM CHLORIDE: 600; 310; 30; 20 INJECTION, SOLUTION INTRAVENOUS at 13:44:00

## 2024-01-24 RX ADMIN — LIDOCAINE HYDROCHLORIDE 50 MG: 10 INJECTION, SOLUTION EPIDURAL; INFILTRATION; INTRACAUDAL; PERINEURAL at 13:49:00

## 2024-01-24 RX ADMIN — SODIUM CHLORIDE: 9 INJECTION, SOLUTION INTRAVENOUS at 14:47:00

## 2024-01-24 RX ADMIN — ROCURONIUM BROMIDE 40 MG: 10 INJECTION, SOLUTION INTRAVENOUS at 13:49:00

## 2024-01-24 RX ADMIN — MIDAZOLAM HYDROCHLORIDE 2 MG: 1 INJECTION INTRAMUSCULAR; INTRAVENOUS at 13:44:00

## 2024-01-24 RX ADMIN — SODIUM CHLORIDE: 9 INJECTION, SOLUTION INTRAVENOUS at 13:56:00

## 2024-01-24 NOTE — OPERATIVE REPORT
OPERATIVE REPORT: Laparoscopic left salpingo-oophorectomy and right ovarian cyst fulguration    Patient: Debra Jenkins  MRN: [id number]  Date: 1/24/24    Pre op diagnyosis  Left dermoid cyst, right simple ovarian cyst    Post op diagnosis   Same, necrotic appearing left fallopian tube     Procedure:  Laparoscopic left salpingo-oophorectomy and right ovarian cyst fulguration       Surgeon: Della Hastings DO    Assisting Surgeon: Alonzo Bar CSA    Findings Normal external genitalia, no lesions. Large dermoid cyst on left ovary. No normal ovarian tissue visible. Simple cyst approx 2 cm on right ovary. After oophorectomy complete, dusky/gray appearance of fallopian tube.  All specimens removed and collected to be sent to pathology. Good hemostasis noted.     Anesthesia General     EBL: 15 mL    Fluids: 750 mL    Urine output: 100 mL    Prodedure  After informed consent was obtained, the patient was taken to the operating room and general anesthesia was initiated. She was placed in supine position. Gruber catheter was placed. She was prepped and draped in normal sterile fashion.      After infiltration with 0.25% marcaine, a vertical 5 mm incision was made in the superior aspect of the umbilicus. A Veress needle was then placed through the incision and advanced until two pop-like sensation were noted. A sterile syringed filled with normal saline was used to confirm placement of the Veress needle in the abdominal cavity. Once placement was confirmed, the gas tubing was connected and Co2 gas insufflation was initiated with opening pressure of 6 mmHg. The abdomen was insufflated to pressure of 15 mmHg. The laparoscopic camera was then placed into the 5 mm optiview trocar and the abdomen was then entered under direct visualization.  A second and third horizontal 5 mm incisions were then made in the bilateral lower quadrants after infiltration with 0.25% marcaine. And 5 mm trocars were advanced under laparoscopic  visualization. The patient was placed in slight trendelenburg position. A pelvic survey was performed with findings noted above.     The left ovary was lifted and the infundibuolpelvic ligament was fulgurated and transected with the enseal device. The fallopian tube was adherent to the ovary, it was carfully lifted and transected from the ovary using the enseal. The utero-ovarian ligament was then transected with the Enseal device. The ovary was free of all connections. The right lower quadrant trocar site was extended for a 15 mm trocar. This was advanced and an endocatch bag was inserted. The ovary was placed in the bag. It was brought to the surface. The trocar was removed and the ovary was then removed after the fatty contents were drained at the surface.  The trocar was replaced and pelvic survey was performed. The distal end of the left fallopian tube was noted to be dusky and gray. The Enseal device was then used to remove this distal end of the fallopian tube including the fimbria. The specimen was removed from the pelvis under direct visualization and collected to be sent to pathology.     The right ovarian cyst was fulgurated using electrocautery.    Good hemostasis noted. The 15 mm trocar was removed and the fascia was repaired with 0-vicryl using the [employer] device x 2. The pneumoperitoneum was released. The remaining trocars were removed. The port sites were closed with 4-0 monocryl sutures and dermabond skin glue.     The valdez urinary catheter was removed.  All sponge, lap and instrument counts were correct on 2 counts. Patient tolerated the procedure well and was taken to recovery room in stable condition.     Disposition: stable     Complications: None       Della Hastings DO

## 2024-01-24 NOTE — ANESTHESIA PROCEDURE NOTES
Airway  Date/Time: 1/24/2024 1:52 PM  Urgency: Elective    Airway not difficult    General Information and Staff    Patient location during procedure: OR  Anesthesiologist: Maria Luisa Anderson MD  Resident/CRNA: Letty Key CRNA  Performed: CRNA   Performed by: Letty Key CRNA  Authorized by: Maria Luisa Anderson MD      Indications and Patient Condition  Indications for airway management: anesthesia  Sedation level: deep  Preoxygenated: yes  Patient position: sniffing  Mask difficulty assessment: 1 - vent by mask    Final Airway Details  Final airway type: endotracheal airway      Successful airway: ETT  Cuffed: yes   Successful intubation technique: direct laryngoscopy  Facilitating devices/methods: intubating stylet  Endotracheal tube insertion site: oral  Blade: Renée  Blade size: #3  ETT size (mm): 7.0    Cormack-Lehane Classification: grade IIA - partial view of glottis  Placement verified by: capnometry   Measured from: lips  ETT to lips (cm): 21  Number of attempts at approach: 1  Number of other approaches attempted: 0    Additional Comments  Preoxygenated. Atraumatic intubation on first attempt. Dentition and soft tissues appear in preop condition. OG inserted without resistance, clear drainage, placed to LIS. Gauze bite lack between molars, tongue free from pressure.

## 2024-01-24 NOTE — H&P
GYN Pre-op History and Physical  [facility]    CHIEF COMPLAINT: Scheduled surgery   HISTORY OF PRESENT ILLNESS:   Debra Jenkins is a 33 year old female   who presents for scheduled laparoscopic ovarian cystectomy.    No complaints today.      PAST MEDICAL HISTORY:   Past Medical History:   Diagnosis Date    Anxiety state     Depression     PONV (postoperative nausea and vomiting)     Psoriasis         PAST SURGICAL HISTORY:   Past Surgical History:   Procedure Laterality Date    Patient denies any surgical history          PAST OB HISTORY:  OB History    Para Term  AB Living   0 0 0 0 0 0   SAB IAB Ectopic Multiple Live Births   0 0 0 0 0       CURRENT MEDICATIONS:      Current Outpatient Medications:     Meloxicam 15 MG Oral Tab, Take 1 tablet (15 mg total) by mouth daily with food., Disp: 30 tablet, Rfl: 2    pantoprazole 20 MG Oral Tab EC, Take 2 tablets (40 mg total) by mouth daily as needed., Disp: 30 tablet, Rfl: 2    ibuprofen 600 MG Oral Tab, Take 1 tablet (600 mg total) by mouth every 6 (six) hours as needed for Pain., Disp: 60 tablet, Rfl: 0    escitalopram 5 MG Oral Tab, Take 1 tablet (5 mg total) by mouth daily., Disp: , Rfl:     betamethasone 0.1 % External Cream, Apply 1 Application topically 2 (two) times daily., Disp: 15 g, Rfl: 1    Levonorgest-Eth Estrad -Day (SEASONIQUE) 0.15-0.03 &0.01 MG Oral Tab, Take 1 tablet by mouth daily., Disp: 91 tablet, Rfl: 3    escitalopram 10 MG Oral Tab, Take 1 tablet (10 mg total) by mouth daily., Disp: 90 tablet, Rfl: 3    ALPRAZolam (XANAX) 0.25 MG Oral Tab, Take 1 tablet (0.25 mg total) by mouth daily as needed for Anxiety. To take once per day as needed for anxiety. Can cause sedation/ fatigue., Disp: 30 tablet, Rfl: 1    gabapentin 300 MG Oral Cap, Take 1 capsule (300 mg total) by mouth every 8 (eight) hours as needed (moderate surgical pain). (Patient not taking: Reported on 2023), Disp: 20 capsule, Rfl:  0    ALLERGIES:  Allergies   Allergen Reactions    Sulfa Antibiotics RASH     Other reaction(s): Unknown       SOCIAL HISTORY:  Social History     Socioeconomic History    Marital status:    Tobacco Use    Smoking status: Never    Smokeless tobacco: Never   Vaping Use    Vaping Use: Never used   Substance and Sexual Activity    Alcohol use: Yes     Comment: socially - 1-2 times per week.    Drug use: No    Sexual activity: Not Currently   Other Topics Concern    Blood Transfusions No         FAMILY HISTORY:  Family History   Problem Relation Age of Onset    Musculo-skelatal Disorder Father     Diabetes Father     Lipids Father     Cancer Father         skin ca    Musculo-skelatal Disorder Mother     Other (perforated bowel from hernia) Mother     Other (ovarian dermoid) Mother     Other (fibroids) Sister     Other (fibroids) Sister      ASSESSMENTS:  PHYSICAL EXAM:   Patient's last menstrual period was 12/31/2023 (exact date).   Vitals:    01/20/24 1153   Weight: 215 lb (97.5 kg)   Height: 68\"     CONSTITUTIONAL: Awake, alert, cooperative, no apparent distress, and appears stated age   NECK: Supple, symmetrical, trachea midline, no adenopathy, thyroid symmetric, not enlarged and no tenderness  LUNGS: No excess work of breathing, LCTAB  CV: RRR no murmurs  ABDOMEN: Soft, non-distended, non-tender, no masses palpated    GENITAL/URINARY:    Nontender uterus.    MUSCULOSKELETAL: There is no redness, warmth, or swelling of the joints. Tone is normal.  NEUROLOGIC: Patient is awake, alert and oriented to name, place and time. Casual gait is normal.  SKIN: no bruising or bleeding and no rashes  PSYCHIATRIC: Behavior:  Appropriate  Mood:  appropriate    US: 11/8/23  Findings:   Left ovary   Length 7.62 cm   Width 6.45 cm   Height 5.86 cm   Volume 150.803   Complex cyst: 7.5 x 4.6 x 5.8 cm     Right Ovary   Length 3.96 cm   Width 2.55 cm   Height 2.56 cm   Volume 13.545   Simple cyst: 1.2 x 1.4 cm     Uterus   Length  7.68 cm   Width 4.89 cm   Height 3.72 cm   Volume 73.150   Endometrial Thickness 7.34 mm     Impression:   Uterus is normal in size and echotexture.   Right ovary is normal with a follicle as noted above.   Left ovary appears to be almost completely replaced by a dermoid cyst.   ASSESSMENT AND PLAN:  33 year old  here for scheduled laparoscopic ovarian cystectomy  - risks of surgery reviewed including pain, bleeding, infection, injury to surrounding structures (bowel, bladder, ureters), possible need for oophorectomy or laparotomy. All questions answered, consent form signed and in chart  - proceed to OR when ready.     Plan for dc home once stable post-op    Della Hastings, DO

## 2024-01-24 NOTE — ANESTHESIA POSTPROCEDURE EVALUATION
Patient: Debra Jenkins    Procedure Summary       Date: 01/24/24 Room / Location: [facility] MAIN OR 01 / [facility] OR    Anesthesia Start: 1344 Anesthesia Stop: 1452    Procedure: LAPAROSCOPIC RIGHT OVARIAN CYST FULGURATION, LEFT SALPINGOOPHORECTOMY (Left: Abdomen) Diagnosis:       Dermoid cyst of left ovary      (Dermoid cyst of left ovary [D27.1])    Surgeons: Della Hastings DO Anesthesiologist: Maria Luisa Anderson MD    Anesthesia Type: general ASA Status: 2            Anesthesia Type: general    Vitals Value Taken Time   /97 01/24/24 1451   Temp 98.5 °F (36.9 °C) 01/24/24 1451   Pulse 63 01/24/24 1452   Resp 21 01/24/24 1452   SpO2 94 % 01/24/24 1452   Vitals shown include unfiled device data.    [facility] AN Post Evaluation:   Patient Evaluated in PACU  Patient Participation: complete - patient participated  Level of Consciousness: awake  Pain Management: adequate  Airway Patency:patent  Dental exam unchanged from preop  Yes    Cardiovascular Status: acceptable  Respiratory Status: acceptable  Postoperative Hydration acceptable      MALIA FLORES CRNA  1/24/2024 2:52 PM

## 2024-01-24 NOTE — ANESTHESIA PREPROCEDURE EVALUATION
Anesthesia PreOp Note    HPI:     Debra Jenkins is a 33 year old female who presents for preoperative consultation requested by: Della Hastings DO    Date of Surgery: 1/24/2024    Procedure(s):  Laparoscopic left ovarian cystectomy, possible oophorectomy, possible laparotomy  Indication: Dermoid cyst of left ovary [D27.1]    Relevant Problems   No relevant active problems       NPO:  Last Liquid Consumption Date: 01/24/24  Last Liquid Consumption Time: 0800  Last Solid Consumption Date: 01/23/24  Last Solid Consumption Time: 2359  Last Liquid Consumption Date: 01/24/24          History Review:  There are no problems to display for this patient.      Past Medical History:   Diagnosis Date    Anxiety state     Depression     PONV (postoperative nausea and vomiting)     Psoriasis        Past Surgical History:   Procedure Laterality Date    PATIENT DENIES ANY SURGICAL HISTORY         Medications Prior to Admission   Medication Sig Dispense Refill Last Dose    ibuprofen 600 MG Oral Tab Take 1 tablet (600 mg total) by mouth every 6 (six) hours as needed for Pain. 60 tablet 0 1/19/2024    escitalopram 5 MG Oral Tab Take 1 tablet (5 mg total) by mouth daily.   1/22/2024    betamethasone 0.1 % External Cream Apply 1 Application topically 2 (two) times daily. 15 g 1 Past Week    Levonorgest-Eth Estrad 91-Day (SEASONIQUE) 0.15-0.03 &0.01 MG Oral Tab Take 1 tablet by mouth daily. 91 tablet 3 Past Week    escitalopram 10 MG Oral Tab Take 1 tablet (10 mg total) by mouth daily. 90 tablet 3 1/22/2024    Meloxicam 15 MG Oral Tab Take 1 tablet (15 mg total) by mouth daily with food. 30 tablet 2 Unknown    pantoprazole 20 MG Oral Tab EC Take 2 tablets (40 mg total) by mouth daily as needed. 30 tablet 2 More than a month    gabapentin 300 MG Oral Cap Take 1 capsule (300 mg total) by mouth every 8 (eight) hours as needed (moderate surgical pain). (Patient not taking: Reported on 11/30/2023) 20 capsule 0 Unknown    ALPRAZolam  (XANAX) 0.25 MG Oral Tab Take 1 tablet (0.25 mg total) by mouth daily as needed for Anxiety. To take once per day as needed for anxiety. Can cause sedation/ fatigue. 30 tablet 1 More than a month     Current Facility-Administered Medications Ordered in Epic   Medication Dose Route Frequency Provider Last Rate Last Admin    lactated ringers infusion   Intravenous Continuous Dubyel, Della T, DO 20 mL/hr at 01/24/24 1223 New Bag at 01/24/24 1223    metoclopramide (Reglan) tab 10 mg  10 mg Oral Once Dubyel, Della T, DO        Or    metoclopramide (Reglan) 5 mg/mL injection 10 mg  10 mg Intravenous Once Dubyel, Della T, DO         No current [facility]-ordered outpatient medications on file.       Allergies   Allergen Reactions    Sulfa Antibiotics RASH     Other reaction(s): Unknown       Family History   Problem Relation Age of Onset    Musculo-skelatal Disorder Father     Diabetes Father     Lipids Father     Cancer Father         skin ca    Musculo-skelatal Disorder Mother     Other (perforated bowel from hernia) Mother     Other (ovarian dermoid) Mother     Other (fibroids) Sister     Other (fibroids) Sister      Social History     Socioeconomic History    Marital status:    Tobacco Use    Smoking status: Never    Smokeless tobacco: Never   Vaping Use    Vaping Use: Never used   Substance and Sexual Activity    Alcohol use: Yes     Comment: socially - 1-2 times per week.    Drug use: No    Sexual activity: Not Currently   Other Topics Concern    Blood Transfusions No       Available pre-op labs reviewed.  Lab Results   Component Value Date    WBC 6.8 01/21/2024    RBC 4.64 01/21/2024    HGB 13.9 01/21/2024    HCT 40.5 01/21/2024    MCV 87.3 01/21/2024    MCH 30.0 01/21/2024    MCHC 34.3 01/21/2024    RDW 12.5 01/21/2024    .0 01/21/2024    URINEPREG Negative 01/24/2024     Lab Results   Component Value Date     11/30/2023    K 4.5 11/30/2023     11/30/2023    CO2 27.0 11/30/2023    BUN 9 11/30/2023     CREATSERUM 0.72 11/30/2023    GLU 80 11/30/2023    CA 9.4 11/30/2023          Vital Signs:  Body mass index is 33.6 kg/m².   height is 1.727 m (5' 8\") and weight is 100.2 kg (221 lb). Her oral temperature is 98.3 °F (36.8 °C). Her blood pressure is 131/83 and her pulse is 85. Her respiration is 16 and oxygen saturation is 96%.   Vitals:    01/20/24 1153 01/24/24 1221   BP:  131/83   Pulse:  85   Resp:  16   Temp:  98.3 °F (36.8 °C)   TempSrc:  Oral   SpO2:  96%   Weight: 97.5 kg (215 lb) 100.2 kg (221 lb)   Height: 1.727 m (5' 8\")         Anesthesia Evaluation     Patient summary reviewed and Nursing notes reviewed    No history of anesthetic complications   Airway   Mallampati: II  TM distance: >3 FB  Neck ROM: full  Dental      Comment: Patient denies any additional loose, missing, and chipped teeth.    Pulmonary - negative ROS and normal exam    breath sounds clear to auscultation  (-) asthma, shortness of breath, recent URI, sleep apnea  Cardiovascular - negative ROS and normal exam  Exercise tolerance: good  (-) pacemaker, hypertension, valvular problems/murmurs, past MI, CAD, CABG/stent, dysrhythmias, angina, CHF    Rhythm: regular  Rate: normal    Neuro/Psych    (+)  anxiety/panic attacks,      (-) seizures, TIA, CVA    GI/Hepatic/Renal - negative ROS   (-) GERD, liver disease, renal disease    Endo/Other - negative ROS   (-) diabetes mellitus, blood dyscrasia  Abdominal                  Anesthesia Plan:   ASA:  2  Plan:   General  Airway:  ETT  Informed Consent Plan and Risks Discussed With:  Patient      I have informed Debra SCHAEFER Gregory and/or legal guardian or family member of the nature of the anesthetic plan, benefits, risks including possible dental damage if relevant, major complications, and any alternative forms of anesthetic management.   All of the patient's questions were answered to the best of my ability. The patient desires the anesthetic management as planned.  Maria Luisa Anderson MD  1/24/2024  12:41 PM  Present on Admission:  **None**

## 2024-01-29 ENCOUNTER — MOBILE ENCOUNTER (OUTPATIENT)
Dept: OBGYN CLINIC | Facility: CLINIC | Age: 34
End: 2024-01-29

## 2024-01-29 NOTE — PROGRESS NOTES
Telephone call:  Patient paged due to rash.  Patient noted the rash at her upper thighs bilaterally up to about her mid abdomen/bra line.  Patient stated the rash is red and splotchy without lesions.  Patient denied any chest pain, shortness of breath, wheezing, lip swelling, throat swelling.  She was informed to take Benadryl twice a day and utilize topical Benadryl and/or calamine lotion.  She was also informed to present to the [facility] emergency department if she should develop any chest pain, shortness of breath, wheezing, lip or throat swelling.  Patient understood    Dr. Terrence Stanton MD  [facility]

## 2024-01-30 ENCOUNTER — PATIENT MESSAGE (OUTPATIENT)
Dept: OBGYN CLINIC | Facility: CLINIC | Age: 34
End: 2024-01-30

## 2024-02-02 ENCOUNTER — TELEPHONE (OUTPATIENT)
Dept: OBGYN CLINIC | Facility: CLINIC | Age: 34
End: 2024-02-02

## 2024-02-02 ENCOUNTER — OFFICE VISIT (OUTPATIENT)
Dept: FAMILY MEDICINE CLINIC | Facility: CLINIC | Age: 34
End: 2024-02-02
Payer: COMMERCIAL

## 2024-02-02 VITALS
SYSTOLIC BLOOD PRESSURE: 106 MMHG | HEART RATE: 94 BPM | HEIGHT: 68 IN | TEMPERATURE: 99 F | WEIGHT: 220 LBS | RESPIRATION RATE: 14 BRPM | BODY MASS INDEX: 33.34 KG/M2 | OXYGEN SATURATION: 99 % | DIASTOLIC BLOOD PRESSURE: 70 MMHG

## 2024-02-02 DIAGNOSIS — L23.5 ALLERGIC DERMATITIS DUE TO OTHER CHEMICAL PRODUCT: Primary | ICD-10-CM

## 2024-02-02 PROCEDURE — 99213 OFFICE O/P EST LOW 20 MIN: CPT | Performed by: NURSE PRACTITIONER

## 2024-02-02 PROCEDURE — 3074F SYST BP LT 130 MM HG: CPT | Performed by: NURSE PRACTITIONER

## 2024-02-02 PROCEDURE — 3008F BODY MASS INDEX DOCD: CPT | Performed by: NURSE PRACTITIONER

## 2024-02-02 PROCEDURE — 3078F DIAST BP <80 MM HG: CPT | Performed by: NURSE PRACTITIONER

## 2024-02-02 RX ORDER — TRIAMCINOLONE ACETONIDE 1 MG/G
1 OINTMENT TOPICAL 2 TIMES DAILY
Qty: 30 G | Refills: 0 | Status: SHIPPED | OUTPATIENT
Start: 2024-02-02

## 2024-02-02 RX ORDER — PREDNISONE 20 MG/1
30 TABLET ORAL DAILY
Qty: 8 TABLET | Refills: 0 | Status: SHIPPED | OUTPATIENT
Start: 2024-02-02 | End: 2024-02-07

## 2024-02-02 RX ORDER — DICLOFENAC POTASSIUM 50 MG/1
TABLET, FILM COATED ORAL
COMMUNITY
Start: 2023-12-27 | End: 2024-02-02

## 2024-02-02 RX ORDER — AZELASTINE HYDROCHLORIDE 137 UG/1
SPRAY, METERED NASAL
COMMUNITY
Start: 2023-12-27 | End: 2024-02-02

## 2024-02-02 NOTE — TELEPHONE ENCOUNTER
RN spoke to pt and told her that  recommends that the pt go to IC for further evaluation, as she may require additional medication to treat the rash. Pt verbalized understanding and agreed with POC.

## 2024-02-02 NOTE — PATIENT INSTRUCTIONS
Self-Care for Skin Rashes   A rash is your skin’s reaction to a substance your body is sensitive to. Most rashes can be treated at home by keeping the skin clean and dry. But some rashes may be signs of a more serious problem. Call your doctor if you notice other symptoms with your rash, or if the rash is getting worse.   Common Causes of Rashes   Too much exposure to the sun   An allergic reaction to a food (shrimp), plant (poison ivy), or chemical (strong detergent)   An infection caused by a fungus (ringworm), virus (chickenpox), or bacteria (strep)   Bites or infestation due to insects or pests, such as ticks, lice, or mites  Control Itching and Skin Damage   Take soothing baths. Try 1 cup of oatmeal or baking soda in a tub of warm (not hot) water.   Do your best not to scratch. And clip your fingernails to reduce skin damage if you do scratch.  Keep Your Skin Clean and Dry   Wash with mild, nonirritating soap and warm water.   Wear clothing that breathes, such as cotton shirts or canvas shoes.   If fluid is seeping from the rash, cover it loosely with clean gauze to absorb the discharge.   Many rashes are contagious. Prevent the rash from spreading to others by not sharing towels.  Use Medication   Take antihistamines to block your body’s reaction to the substance causing the rash. (Claritin, Zyrtec or Benadryl)  Use the medication prescribed today  Follow Up  Most rashes should begin to improve within 72 hours of treatment, if no improvement follow up.

## 2024-02-02 NOTE — PROGRESS NOTES
CHIEF COMPLAINT:     Chief Complaint   Patient presents with    Rash     Entered by patient          HPI:    Debra Jenkins is a 33 year old female who presents for evaluation of a rash.  Per patient rash started in the past 7 days. Rash has been worsening since onset.  Patient never had similar rash in the past. The rash is characterized by severe itching. The affected locations include mainly lower abdomen and hips. Patient has treated rash with benadryl, hydrocortisone cream, lotion and has been using unscented soap to cleans.  Associated symptoms include: + pain/irritation at times - feels like a sunburn, ++ itching, + redness, no tingling, no drainage.  No recent viral infections. Exposure: had a lap oophorectomy on 1/24/24. A couple of days later rash developed only on front area - thinks its allergic reaction to what they cleaned her skin with.      Pertinent negatives include no anorexia, congestion, cough, diarrhea, eye pain, facial edema, fatigue, fever, joint pain, rhinorrhea, shortness of breath, sore throat or vomiting.      Current Outpatient Medications   Medication Sig Dispense Refill    predniSONE 20 MG Oral Tab Take 1.5 tablets (30 mg total) by mouth daily for 5 days. 8 tablet 0    triamcinolone 0.1 % External Ointment Apply 1 Application topically 2 (two) times daily. 30 g 0    ibuprofen 600 MG Oral Tab Take 1 tablet (600 mg total) by mouth every 6 (six) hours as needed for Pain. 60 tablet 0    escitalopram 5 MG Oral Tab Take 1 tablet (5 mg total) by mouth daily.      escitalopram 10 MG Oral Tab Take 1 tablet (10 mg total) by mouth daily. 90 tablet 3    ALPRAZolam (XANAX) 0.25 MG Oral Tab Take 1 tablet (0.25 mg total) by mouth daily as needed for Anxiety. To take once per day as needed for anxiety. Can cause sedation/ fatigue. 30 tablet 1    Meloxicam 15 MG Oral Tab Take 1 tablet (15 mg total) by mouth daily with food. (Patient not taking: Reported on 2/2/2024) 30 tablet 2    pantoprazole 20 MG  Oral Tab EC Take 2 tablets (40 mg total) by mouth daily as needed. (Patient not taking: Reported on 2/2/2024) 30 tablet 2    betamethasone 0.1 % External Cream Apply 1 Application topically 2 (two) times daily. (Patient not taking: Reported on 2/2/2024) 15 g 1    Levonorgest-Eth Estrad 91-Day (SEASONIQUE) 0.15-0.03 &0.01 MG Oral Tab Take 1 tablet by mouth daily. (Patient not taking: Reported on 2/2/2024) 91 tablet 3      Past Medical History:   Diagnosis Date    Anxiety state     Depression     PONV (postoperative nausea and vomiting)     Psoriasis       Past Surgical History:   Procedure Laterality Date    PATIENT DENIES ANY SURGICAL HISTORY        Family History   Problem Relation Age of Onset    Musculo-skelatal Disorder Father     Diabetes Father     Lipids Father     Cancer Father         skin ca    Musculo-skelatal Disorder Mother     Other (perforated bowel from hernia) Mother     Other (ovarian dermoid) Mother     Other (fibroids) Sister     Other (fibroids) Sister       Social History     Socioeconomic History    Marital status:    Tobacco Use    Smoking status: Never    Smokeless tobacco: Never   Vaping Use    Vaping Use: Never used   Substance and Sexual Activity    Alcohol use: Yes     Comment: socially - 1-2 times per week.    Drug use: No    Sexual activity: Not Currently   Other Topics Concern    Blood Transfusions No         REVIEW OF SYSTEMS:   GENERAL: feels well otherwise, no fever, no chills.  SKIN: Per HPI. No edema. No ulcerations.  HEENT: Denies rhinorrhea, edema of the lips or swelling of throat.  CARDIOVASCULAR: Denies chest pains or palpitations.  LUNGS: Denies shortness of breath with exertion or rest. No cough or wheezing.  LYMPH: Denies enlargement of the lymph nodes.  NEURO: Denies abnormal sensation, tingling of the skin, or numbness.      EXAM:   /70   Pulse 94   Temp 99.3 °F (37.4 °C)   Resp 14   Ht 5' 8\" (1.727 m)   Wt 220 lb (99.8 kg)   LMP 12/31/2023 (Exact  Date)   SpO2 99%   BMI 33.45 kg/m²     Physical Exam  Constitutional:       General: She is not in acute distress.     Appearance: Normal appearance.   HENT:      Head: Normocephalic and atraumatic.      Right Ear: External ear normal.      Left Ear: External ear normal.      Nose: Nose normal.      Mouth/Throat:      Mouth: Mucous membranes are moist.      Pharynx: Oropharynx is clear.   Eyes:      Extraocular Movements: Extraocular movements intact.      Conjunctiva/sclera: Conjunctivae normal.   Cardiovascular:      Rate and Rhythm: Normal rate and regular rhythm.      Heart sounds: Normal heart sounds.   Pulmonary:      Effort: Pulmonary effort is normal.      Breath sounds: Normal breath sounds and air entry.   Musculoskeletal:      Cervical back: Normal range of motion and neck supple.   Lymphadenopathy:      Lower Body: No right inguinal adenopathy. No left inguinal adenopathy.   Skin:     General: Skin is warm and dry.      Findings: Erythema and rash present. Rash is macular.             Comments: Mainly concentrated to hips - erythematous patches.  Few scattered erythematous macules to upper abdomen. Remainder of trunk clear.   Neurological:      Mental Status: She is alert.         No results found for this or any previous visit (from the past 24 hour(s)).      ASSESSMENT AND PLAN:   Debra Jenkins is a 33 year old female who presents for evaluation of a rash. Findings are consistent with:    ASSESSMENT:  Encounter Diagnosis   Name Primary?    Allergic dermatitis due to other chemical product Yes       PLAN:  Comfort measures as described in Patient Instructions.    Skin care discussed with patient.   Avoid hot showers, as this can aggravate the rash. Cool/tepid showers with minimal mild soap. Gently pat area dry.  Cold compresses may help relieve the itching  Medications as listed below.      Meds & Refills for this Visit:  Requested Prescriptions     Signed Prescriptions Disp Refills    predniSONE  20 MG Oral Tab 8 tablet 0     Sig: Take 1.5 tablets (30 mg total) by mouth daily for 5 days.    triamcinolone 0.1 % External Ointment 30 g 0     Sig: Apply 1 Application topically 2 (two) times daily.       Risk and benefits of medication discussed.   The patient is asked to follow up with PCP in 3 days if sx's persist or worsen.  The patient indicates understanding of these issues and agrees to the plan.      Patient Instructions   Self-Care for Skin Rashes   A rash is your skin’s reaction to a substance your body is sensitive to. Most rashes can be treated at home by keeping the skin clean and dry. But some rashes may be signs of a more serious problem. Call your doctor if you notice other symptoms with your rash, or if the rash is getting worse.   Common Causes of Rashes   Too much exposure to the sun   An allergic reaction to a food (shrimp), plant (poison ivy), or chemical (strong detergent)   An infection caused by a fungus (ringworm), virus (chickenpox), or bacteria (strep)   Bites or infestation due to insects or pests, such as ticks, lice, or mites  Control Itching and Skin Damage   Take soothing baths. Try 1 cup of oatmeal or baking soda in a tub of warm (not hot) water.   Do your best not to scratch. And clip your fingernails to reduce skin damage if you do scratch.  Keep Your Skin Clean and Dry   Wash with mild, nonirritating soap and warm water.   Wear clothing that breathes, such as cotton shirts or canvas shoes.   If fluid is seeping from the rash, cover it loosely with clean gauze to absorb the discharge.   Many rashes are contagious. Prevent the rash from spreading to others by not sharing towels.  Use Medication   Take antihistamines to block your body’s reaction to the substance causing the rash. (Claritin, Zyrtec or Benadryl)  Use the medication prescribed today  Follow Up  Most rashes should begin to improve within 72 hours of treatment, if no improvement follow up.

## 2024-02-02 NOTE — TELEPHONE ENCOUNTER
Patient is calling requesting to speak to RN regarding allergic reaction after surgery on 1/24/2024 per patient spoke to Doctor on call on 1/29/2024 (Dr. Stanton) regarding the reaction and the rush seems to not be getting better. Please assist.

## 2024-02-02 NOTE — TELEPHONE ENCOUNTER
RN spoke to pt. Pt had LAPAROSCOPIC RIGHT OVARIAN CYST FULGURATION, LEFT SALPINGOOPHORECTOMY on 1/24 with RD. Pt has been having an allergic rxn to the cleanser used during surgery. Pt was instructed to take Benadryl BID and apply hydrocortisone cream, but it hasn't helped at all. The pt is tearful on the phone and says that although most of her surgical pain has resolved, the rash is so uncomfortable that she is miserable. Pt denies SOB, chest tightness, facial swelling, throat swelling, or other signs of anaphylaxis. RN told pt that RN would speak to on-call provider and would call her back. Pt verbalized understanding and agreed with POC.    On-call provider, LAURA, paged.

## 2024-02-06 ENCOUNTER — OFFICE VISIT (OUTPATIENT)
Dept: OBGYN CLINIC | Facility: CLINIC | Age: 34
End: 2024-02-06
Payer: COMMERCIAL

## 2024-02-06 VITALS
HEIGHT: 68 IN | WEIGHT: 220 LBS | SYSTOLIC BLOOD PRESSURE: 102 MMHG | BODY MASS INDEX: 33.34 KG/M2 | DIASTOLIC BLOOD PRESSURE: 70 MMHG

## 2024-02-06 DIAGNOSIS — Z09 POSTOPERATIVE EXAMINATION: Primary | ICD-10-CM

## 2024-02-06 PROCEDURE — 3008F BODY MASS INDEX DOCD: CPT | Performed by: OBSTETRICS & GYNECOLOGY

## 2024-02-06 PROCEDURE — 3078F DIAST BP <80 MM HG: CPT | Performed by: OBSTETRICS & GYNECOLOGY

## 2024-02-06 PROCEDURE — 99024 POSTOP FOLLOW-UP VISIT: CPT | Performed by: OBSTETRICS & GYNECOLOGY

## 2024-02-06 PROCEDURE — 3074F SYST BP LT 130 MM HG: CPT | Performed by: OBSTETRICS & GYNECOLOGY

## 2024-02-06 RX ORDER — FAMOTIDINE 20 MG/1
40 TABLET, FILM COATED ORAL 2 TIMES DAILY PRN
Qty: 60 TABLET | Refills: 0 | Status: SHIPPED | OUTPATIENT
Start: 2024-02-06

## 2024-02-06 RX ORDER — TRIAMCINOLONE ACETONIDE 1 MG/G
CREAM TOPICAL 2 TIMES DAILY
Qty: 80 G | Refills: 1 | Status: SHIPPED | OUTPATIENT
Start: 2024-02-06

## 2024-02-06 NOTE — PROGRESS NOTES
RETURN GYN OFFICE VISIT  [facility]    CHIEF COMPLAINT:    Chief Complaint   Patient presents with    Post-Op     2 wk f/u;       HISTORY OF PRESENT ILLNESS:    CASSANDRA is a 33 year old female  here for post-op appt s/p laparoscopic LSO for dermoid cyst on 24.  Rash on belly. Very itchy. Taking oral prednisone, has topicla triamcinolone.  Benadryl did nothing.    Pain is minimal. Rlq incision a little more sore than the others.    No other complaints.      REVIEW OF SYSTEMS:   CONSTITUTIONAL:  negative for fevers, chills, sweats and fatigue  GASTROINTESTINAL:  negative for nausea, vomiting, blood in stool, constipation, diarrhea and abdominal pain  GENITOURINARY: negative for no dysuria, urgency or frequency; no urinary incontinence; no hematuria  SKIN:  negative for  rash, skin lesion and pruritus  ENDOCRINE:  negative for acne, fatigue, weight gain and weight loss  BEHAVIOR/PSYCH:  negative for depressed mood, anhedonia and anxiety    CURRENT MEDICATIONS:      Current Outpatient Medications:     famotidine 20 MG Oral Tab, Take 2 tablets (40 mg total) by mouth 2 (two) times daily as needed for Heartburn., Disp: 60 tablet, Rfl: 0    triamcinolone 0.1 % External Cream, Apply topically 2 (two) times daily., Disp: 80 g, Rfl: 1    predniSONE 20 MG Oral Tab, Take 1.5 tablets (30 mg total) by mouth daily for 5 days., Disp: 8 tablet, Rfl: 0    triamcinolone 0.1 % External Ointment, Apply 1 Application topically 2 (two) times daily., Disp: 30 g, Rfl: 0    escitalopram 5 MG Oral Tab, Take 1 tablet (5 mg total) by mouth daily., Disp: , Rfl:     escitalopram 10 MG Oral Tab, Take 1 tablet (10 mg total) by mouth daily., Disp: 90 tablet, Rfl: 3    Meloxicam 15 MG Oral Tab, Take 1 tablet (15 mg total) by mouth daily with food. (Patient not taking: Reported on 2024), Disp: 30 tablet, Rfl: 2    pantoprazole 20 MG Oral Tab EC, Take 2 tablets (40 mg total) by mouth daily as needed. (Patient not taking: Reported  on 2/2/2024), Disp: 30 tablet, Rfl: 2    ibuprofen 600 MG Oral Tab, Take 1 tablet (600 mg total) by mouth every 6 (six) hours as needed for Pain. (Patient not taking: Reported on 2/6/2024), Disp: 60 tablet, Rfl: 0    betamethasone 0.1 % External Cream, Apply 1 Application topically 2 (two) times daily. (Patient not taking: Reported on 2/2/2024), Disp: 15 g, Rfl: 1    Levonorgest-Eth Estrad 91-Day (SEASONIQUE) 0.15-0.03 &0.01 MG Oral Tab, Take 1 tablet by mouth daily. (Patient not taking: Reported on 2/2/2024), Disp: 91 tablet, Rfl: 3    ALPRAZolam (XANAX) 0.25 MG Oral Tab, Take 1 tablet (0.25 mg total) by mouth daily as needed for Anxiety. To take once per day as needed for anxiety. Can cause sedation/ fatigue. (Patient not taking: Reported on 2/6/2024), Disp: 30 tablet, Rfl: 1    PAST MEDICAL, SOCIAL AND FAMILY HISTORY:    Past Medical History:   Diagnosis Date    Anxiety state     Depression     PONV (postoperative nausea and vomiting)     Psoriasis      Past Surgical History:   Procedure Laterality Date    PATIENT DENIES ANY SURGICAL HISTORY       Family History   Problem Relation Age of Onset    Musculo-skelatal Disorder Father     Diabetes Father     Lipids Father     Cancer Father         skin ca    Musculo-skelatal Disorder Mother     Other (perforated bowel from hernia) Mother     Other (ovarian dermoid) Mother     Other (fibroids) Sister     Other (fibroids) Sister      Social History     Socioeconomic History    Marital status:    Tobacco Use    Smoking status: Never    Smokeless tobacco: Never   Vaping Use    Vaping Use: Never used   Substance and Sexual Activity    Alcohol use: Yes     Comment: socially - 1-2 times per week.    Drug use: No    Sexual activity: Not Currently   Other Topics Concern    Blood Transfusions No           PHYSICAL EXAM:   Patient's last menstrual period was 12/31/2023 (exact date).; Body mass index is 33.45 kg/m².      CONSTITUTIONAL:  Awake, alert, cooperative, no  apparent distress  EYES: sclera clear and conjunctiva normal  GASTROINTESTINAL:  soft, non-distended, non-tender, no masses palpated  Laparoscopic incisions x 3 healing well, no erythema/induration.      SKIN:  rash as noted above - in pattern of sterile prep for surgery.  PSYCH:  Affect Normal    Pathology:  Final Diagnosis:      A. Left ovary; oophorectomy:   Ovary with mature cystic teratoma (8.5 cm).      B. Left fallopian tube portion; salpingectomy:   Segment of fallopian tube with no significant pathologic change.                 ASSESSMENT AND PLAN:  1. Postoperative examination  - doing well, reviewed benign pathology and intra-op photos  - ok to return to regular activities.    Rash  - appears to be allergy to the surgical prep. Chlorhexidine added as allergy to her medical record.  - trial of pepcid to decrease itching and zyrtec. Cont topical triamcinolone.        Della Hastings, DO

## 2024-03-18 ENCOUNTER — OFFICE VISIT (OUTPATIENT)
Dept: DERMATOLOGY CLINIC | Facility: CLINIC | Age: 34
End: 2024-03-18

## 2024-03-18 DIAGNOSIS — D23.9 BENIGN NEOPLASM OF SKIN, UNSPECIFIED LOCATION: ICD-10-CM

## 2024-03-18 DIAGNOSIS — L25.9 CONTACT DERMATITIS AND ECZEMA: Primary | ICD-10-CM

## 2024-03-18 DIAGNOSIS — Z80.8 FAMILY HISTORY OF MELANOMA: ICD-10-CM

## 2024-03-18 DIAGNOSIS — D22.9 MULTIPLE NEVI: ICD-10-CM

## 2024-03-18 DIAGNOSIS — L40.0 PSORIASIS VULGARIS: ICD-10-CM

## 2024-03-18 DIAGNOSIS — L81.2 EPHELIDES: ICD-10-CM

## 2024-03-18 PROCEDURE — 99204 OFFICE O/P NEW MOD 45 MIN: CPT | Performed by: DERMATOLOGY

## 2024-03-31 NOTE — PROGRESS NOTES
Debra Jenkins is a 33 year old female.  HPI:     CC:    Chief Complaint   Patient presents with    Full Skin Exam     \"New Patient\" present for FBSE. Denies any concerns at this time. Denies personal Hx of skin cancer. Has family Hx of melanoma(Father).           HISTORY:    Past Medical History:   Diagnosis Date    Anxiety state     Depression     PONV (postoperative nausea and vomiting)     Psoriasis       Past Surgical History:   Procedure Laterality Date    PATIENT DENIES ANY SURGICAL HISTORY        Family History   Problem Relation Age of Onset    Musculo-skelatal Disorder Father     Diabetes Father     Lipids Father     Cancer Father         skin ca    Musculo-skelatal Disorder Mother     Other (perforated bowel from hernia) Mother     Other (ovarian dermoid) Mother     Other (fibroids) Sister     Other (fibroids) Sister       Social History     Socioeconomic History    Marital status:    Tobacco Use    Smoking status: Never    Smokeless tobacco: Never   Vaping Use    Vaping Use: Never used   Substance and Sexual Activity    Alcohol use: Yes     Comment: socially - 1-2 times per week.    Drug use: No    Sexual activity: Not Currently   Other Topics Concern    Blood Transfusions No    History of tanning Yes    Outdoor occupation No    Reaction to local anesthetic No    Pt has a pacemaker No    Pt has a defibrillator No        Current Outpatient Medications   Medication Sig Dispense Refill    escitalopram 5 MG Oral Tab Take 1 tablet (5 mg total) by mouth daily.      escitalopram 10 MG Oral Tab Take 1 tablet (10 mg total) by mouth daily. 90 tablet 3    famotidine 20 MG Oral Tab Take 2 tablets (40 mg total) by mouth 2 (two) times daily as needed for Heartburn. 60 tablet 0    triamcinolone 0.1 % External Cream Apply topically 2 (two) times daily. (Patient not taking: Reported on 3/18/2024) 80 g 1    triamcinolone 0.1 % External Ointment Apply 1 Application topically 2 (two) times daily. (Patient not  taking: Reported on 3/18/2024) 30 g 0    Meloxicam 15 MG Oral Tab Take 1 tablet (15 mg total) by mouth daily with food. (Patient not taking: Reported on 2/2/2024) 30 tablet 2    pantoprazole 20 MG Oral Tab EC Take 2 tablets (40 mg total) by mouth daily as needed. (Patient not taking: Reported on 2/2/2024) 30 tablet 2    ibuprofen 600 MG Oral Tab Take 1 tablet (600 mg total) by mouth every 6 (six) hours as needed for Pain. (Patient not taking: Reported on 2/6/2024) 60 tablet 0    betamethasone 0.1 % External Cream Apply 1 Application topically 2 (two) times daily. (Patient not taking: Reported on 2/2/2024) 15 g 1    Levonorgest-Eth Estrad 91-Day (SEASONIQUE) 0.15-0.03 &0.01 MG Oral Tab Take 1 tablet by mouth daily. (Patient not taking: Reported on 2/2/2024) 91 tablet 3    ALPRAZolam (XANAX) 0.25 MG Oral Tab Take 1 tablet (0.25 mg total) by mouth daily as needed for Anxiety. To take once per day as needed for anxiety. Can cause sedation/ fatigue. (Patient not taking: Reported on 2/6/2024) 30 tablet 1     Allergies:   Allergies   Allergen Reactions    Chlorhexidine RASH    Sulfa Antibiotics RASH     Other reaction(s): Unknown       Past Medical History:   Diagnosis Date    Anxiety state     Depression     PONV (postoperative nausea and vomiting)     Psoriasis      Past Surgical History:   Procedure Laterality Date    PATIENT DENIES ANY SURGICAL HISTORY       Social History     Socioeconomic History    Marital status:      Spouse name: Not on file    Number of children: Not on file    Years of education: Not on file    Highest education level: Not on file   Occupational History    Not on file   Tobacco Use    Smoking status: Never    Smokeless tobacco: Never   Vaping Use    Vaping Use: Never used   Substance and Sexual Activity    Alcohol use: Yes     Comment: socially - 1-2 times per week.    Drug use: No    Sexual activity: Not Currently   Other Topics Concern    Caffeine Concern Not Asked    Exercise Not  Asked    Seat Belt Not Asked    Special Diet Not Asked    Stress Concern Not Asked    Weight Concern Not Asked     Service Not Asked    Blood Transfusions No    Occupational Exposure Not Asked    Hobby Hazards Not Asked    Sleep Concern Not Asked    Back Care Not Asked    Bike Helmet Not Asked    Self-Exams Not Asked    Grew up on a farm Not Asked    History of tanning Yes    Outdoor occupation No    Breast feeding Not Asked    Reaction to local anesthetic No    Pt has a pacemaker No    Pt has a defibrillator No   Social History Narrative    Not on file     Social Determinants of Health     Financial Resource Strain: Not on file   Food Insecurity: Not on file   Transportation Needs: Not on file   Physical Activity: Not on file   Stress: Not on file   Social Connections: Not on file   Housing Stability: Not on file     Family History   Problem Relation Age of Onset    Musculo-skelatal Disorder Father     Diabetes Father     Lipids Father     Cancer Father         skin ca    Musculo-skelatal Disorder Mother     Other (perforated bowel from hernia) Mother     Other (ovarian dermoid) Mother     Other (fibroids) Sister     Other (fibroids) Sister        There were no vitals filed for this visit.    HPI:  Chief Complaint   Patient presents with    Full Skin Exam     \"New Patient\" present for FBSE. Denies any concerns at this time. Denies personal Hx of skin cancer. Has family Hx of melanoma(Father).       Family history melanoma in patient's father no personal history of skin cancer not aware of any history of atypical nevi.  Does have a history of psoriasis.  She has used topicals    Generally careful with sun protection  Patient presents with concerns above.    Patient has been in their usual state of health.     Past notes/ records and appropriate/relevant lab results including pathology and past body maps reviewed. Including outside notes/ PCP notes as appropriate. Updated and new information noted in current  visit.     ROS:  Denies other relevant systemic complaints.      History, medications, allergies reviewed as noted.    Allergies:  Chlorhexidine and Sulfa antibiotics     Physical Examination:     Well-developed well-nourished patient alert oriented in no acute distress.  Exam performed, including scalp, head, neck, face,nails, hair, external eyes, including conjunctival mucosa, eyelids, lips external ears , arms, digits,palms.     Multiple light to medium brown, well marginated, uniformly pigmented, macules and papules 6 mm and less scattered on exam. pigmented lesions examined with dermoscopy benign-appearing patterns.     Waxy tannish keratotic papules scattered, cherry-red vascular papules scattered.    See map today's date for lesions noted .  See assessment and plan below for specific lesions.    Otherwise remarkable for lesions as noted on map.    See A/P  below for additional information:    Assessment / plan:    No orders of the defined types were placed in this encounter.      Meds & Refills for this Visit:  Requested Prescriptions      No prescriptions requested or ordered in this encounter         Encounter Diagnoses   Name Primary?    Contact dermatitis and eczema Yes    Ephelides     Multiple nevi     Benign neoplasm of skin, unspecified location     Psoriasis vulgaris     Family history of melanoma          Patient with recent surgery allergic contact dermatitis to prep continue monitoring not clear whether this was chlorhexidine or Betadine.  Topical steroids may use betamethasone or triamcinolone 2-3 times daily  Patient with few patchy areas of psoriasis   Psoriasis.  Plaque-type.  10% body surface involvement.  Will treat with medications and grid.  Overall skincare, liberal use of emollients discussed.  Consider more aggressive therapy if worsening.Patient will let us know how they are doing over the next several weeks.  Await clinical response to above therapy.  Recheck in 2-3 months if no  improvement.  Let us know if she needs additional refills of medications triamcinolone ointment/cream noted betamethasone cream.    Fair skin ephelides,  Continue sun protection  Family history melanoma continue regular follow-up and monitoring    Benign-appearing nevi, no atypical features on dermoscopy reassurance given monitor.     Waxy tan keratotic papules lesions in areas of concern as noted reassurance given.  Benign nature discussed.  Possibility of cryo, alphahydroxy acids over-the-counter retinol's discussed.     No other susupicious lesions on todays  exam.    Please refer to map for specific lesions.  See additional diagnoses.  Pros cons of various therapies, risks benefits discussed.Pathophysiology discussed with patient.  Therapeutic options reviewed.  See  Medications in grid.  Instructions reviewed at length.    Benign nevi, seborrheic  keratoses, cherry angiomas:  Reassurance regarding other benign skin lesions.Signs and symptoms of skin cancer, ABCDE's of melanoma discussed with patient. Sunscreen (broad-spectrum, ideally mineral-based-UVA/UVB -SPF 30 or higher) use encouraged, sun protection/sun protective clothing, self exams reviewed Followup as noted RTC ---routine checkup    6 mos -one year or p.r.n.    Encounter Times   Including precharting, reviewing chart, prior notes obtaining history: 10 minutes, medical exam :10 minutes, notes on body map, plan, counseling 10minutes My total time spent caring for the patient on the day of the encounter: 30 minutes     The patient indicates understanding of these issues and agrees to the plan.  The patient is asked to return as noted in follow-up/ above.    This note was generated using Dragon voice recognition software.  Please contact me regarding any confusion resulting from errors in recognition..  Note to patient and family: The 21st Century Cures Act makes medical notes like these available to patients. However, be advised this is a medical  document. It is intended as [id number] communication and monitoring of a patient's care needs. It is written in medical language and may contain abbreviations or verbiage that are unfamiliar. It may appear blunt or direct. Medical documents are intended to carry relevant information, facts as evident and the clinical opinion of the practitioner.

## 2024-06-27 ENCOUNTER — OFFICE VISIT (OUTPATIENT)
Dept: OBGYN CLINIC | Facility: CLINIC | Age: 34
End: 2024-06-27

## 2024-06-27 VITALS
HEIGHT: 68 IN | BODY MASS INDEX: 35.6 KG/M2 | SYSTOLIC BLOOD PRESSURE: 124 MMHG | WEIGHT: 234.88 LBS | DIASTOLIC BLOOD PRESSURE: 76 MMHG

## 2024-06-27 DIAGNOSIS — R10.2 PELVIC PAIN: Primary | ICD-10-CM

## 2024-06-27 PROCEDURE — 3074F SYST BP LT 130 MM HG: CPT | Performed by: OBSTETRICS & GYNECOLOGY

## 2024-06-27 PROCEDURE — 3078F DIAST BP <80 MM HG: CPT | Performed by: OBSTETRICS & GYNECOLOGY

## 2024-06-27 PROCEDURE — 3008F BODY MASS INDEX DOCD: CPT | Performed by: OBSTETRICS & GYNECOLOGY

## 2024-06-27 PROCEDURE — 99213 OFFICE O/P EST LOW 20 MIN: CPT | Performed by: OBSTETRICS & GYNECOLOGY

## 2024-06-27 RX ORDER — LEVONORGESTREL / ETHINYL ESTRADIOL AND ETHINYL ESTRADIOL 150-30(84)
1 KIT ORAL DAILY
Qty: 91 TABLET | Refills: 3 | Status: SHIPPED | OUTPATIENT
Start: 2024-06-27 | End: 2025-06-27

## 2024-06-27 NOTE — PROGRESS NOTES
RETURN GYN OFFICE VISIT  [facility]    CHIEF COMPLAINT:    Chief Complaint   Patient presents with    Follow - Up    Consult     Wants to discuss birth control        HISTORY OF PRESENT ILLNESS:    CASSANDRA is a 33 year old female  here for pelvic pain / painful period.    Typically heavy on day 1-3, then light and goes away.    Most recent period on day 4, bleeding got heavier and was very crampy.    So is wondering if birth control is an option to help get back to normal.    Used seasonique before.    Also is concerned about the cramps - the pain makes her worry about a cyst on her remaining ovary.    REVIEW OF SYSTEMS:   CONSTITUTIONAL:  negative for fevers, chills, sweats and fatigue  GASTROINTESTINAL:  negative for nausea, vomiting, blood in stool, constipation, diarrhea and abdominal pain  GENITOURINARY: negative for no dysuria, urgency or frequency; no urinary incontinence; no hematuria  SKIN:  negative for  rash, skin lesion and pruritus  ENDOCRINE:  negative for acne, fatigue, weight gain and weight loss  BEHAVIOR/PSYCH:  negative for depressed mood, anhedonia and anxiety    CURRENT MEDICATIONS:      Current Outpatient Medications:     Levonorgest-Eth Estrad 91-Day (SEASONIQUE) 0.15-0.03 &0.01 MG Oral Tab, Take 1 tablet by mouth daily., Disp: 91 tablet, Rfl: 3    escitalopram 5 MG Oral Tab, Take 1 tablet (5 mg total) by mouth daily., Disp: , Rfl:     escitalopram 10 MG Oral Tab, Take 1 tablet (10 mg total) by mouth daily., Disp: 90 tablet, Rfl: 3    triamcinolone 0.1 % External Cream, Apply topically 2 (two) times daily. (Patient not taking: Reported on 3/18/2024), Disp: 80 g, Rfl: 1    Meloxicam 15 MG Oral Tab, Take 1 tablet (15 mg total) by mouth daily with food. (Patient not taking: Reported on 2024), Disp: 30 tablet, Rfl: 2    pantoprazole 20 MG Oral Tab EC, Take 2 tablets (40 mg total) by mouth daily as needed. (Patient not taking: Reported on 2024), Disp: 30 tablet, Rfl: 2     ibuprofen 600 MG Oral Tab, Take 1 tablet (600 mg total) by mouth every 6 (six) hours as needed for Pain. (Patient not taking: Reported on 2/6/2024), Disp: 60 tablet, Rfl: 0    betamethasone 0.1 % External Cream, Apply 1 Application topically 2 (two) times daily. (Patient not taking: Reported on 2/2/2024), Disp: 15 g, Rfl: 1    Levonorgest-Eth Estrad 91-Day (SEASONIQUE) 0.15-0.03 &0.01 MG Oral Tab, Take 1 tablet by mouth daily. (Patient not taking: Reported on 2/2/2024), Disp: 91 tablet, Rfl: 3    ALPRAZolam (XANAX) 0.25 MG Oral Tab, Take 1 tablet (0.25 mg total) by mouth daily as needed for Anxiety. To take once per day as needed for anxiety. Can cause sedation/ fatigue. (Patient not taking: Reported on 2/6/2024), Disp: 30 tablet, Rfl: 1    PAST MEDICAL, SOCIAL AND FAMILY HISTORY:    Past Medical History:    Anxiety state    Depression    PONV (postoperative nausea and vomiting)    Psoriasis     Past Surgical History:   Procedure Laterality Date    Patient denies any surgical history       Family History   Problem Relation Age of Onset    Musculo-skelatal Disorder Father     Diabetes Father     Lipids Father     Cancer Father         skin ca    Musculo-skelatal Disorder Mother     Other (perforated bowel from hernia) Mother     Other (ovarian dermoid) Mother     Other (fibroids) Sister     Other (fibroids) Sister      Social History     Socioeconomic History    Marital status:    Tobacco Use    Smoking status: Never    Smokeless tobacco: Never   Vaping Use    Vaping status: Never Used   Substance and Sexual Activity    Alcohol use: Yes     Comment: socially - 1-2 times per week.    Drug use: No    Sexual activity: Not Currently   Other Topics Concern    Blood Transfusions No    History of tanning Yes    Outdoor occupation No    Reaction to local anesthetic No    Pt has a pacemaker No    Pt has a defibrillator No           PHYSICAL EXAM:   Patient's last menstrual period was 06/15/2024 (exact date).; Body  mass index is 35.72 kg/m².      CONSTITUTIONAL:  Awake, alert, cooperative, no apparent distress  EYES: sclera clear and conjunctiva normal  GASTROINTESTINAL:  soft, non-distended, non-tender, no masses palpated  Some sensitivity in LLQ no r/r/g.  SKIN:  No rashes  PSYCH:  Affect Normal      ASSESSMENT AND PLAN:  1. Pelvic pain  - US to eval remaining ovary.  - Ocps sent to help periods be more predictable.  - Pelvic US Repeat GYNE Only [11767]; Future      Medications    Levonorgest-Eth Estrad 91-Day (SEASONIQUE) 0.15-0.03 &0.01 MG Oral Tab             eDlla Hastings, DO

## 2024-07-09 ENCOUNTER — ULTRASOUND ENCOUNTER (OUTPATIENT)
Dept: OBGYN CLINIC | Facility: CLINIC | Age: 34
End: 2024-07-09
Payer: COMMERCIAL

## 2024-07-09 DIAGNOSIS — N93.9 ABNORMAL UTERINE BLEEDING (AUB): Primary | ICD-10-CM

## 2024-08-30 RX ORDER — DESOGESTREL AND ETHINYL ESTRADIOL 21-5 (28)
1 KIT ORAL DAILY
Qty: 112 TABLET | Refills: 3 | Status: CANCELLED | OUTPATIENT
Start: 2024-08-30 | End: 2025-08-30

## 2024-10-16 ENCOUNTER — TELEMEDICINE (OUTPATIENT)
Dept: TELEHEALTH | Age: 34
End: 2024-10-16
Payer: COMMERCIAL

## 2024-10-16 DIAGNOSIS — B35.4 TINEA CORPORIS: Primary | ICD-10-CM

## 2024-10-16 PROCEDURE — 99212 OFFICE O/P EST SF 10 MIN: CPT | Performed by: PHYSICIAN ASSISTANT

## 2024-10-16 RX ORDER — KETOCONAZOLE 20 MG/G
1 CREAM TOPICAL DAILY
Qty: 30 G | Refills: 0 | Status: SHIPPED | OUTPATIENT
Start: 2024-10-16

## 2024-10-16 RX ORDER — CLOTRIMAZOLE AND BETAMETHASONE DIPROPIONATE 10; .64 MG/G; MG/G
1 CREAM TOPICAL 2 TIMES DAILY PRN
Qty: 15 G | Refills: 0 | Status: SHIPPED | OUTPATIENT
Start: 2024-10-16

## 2024-10-16 NOTE — PROGRESS NOTES
Virtual/Telephone Check-In    Debra Jenkins verbally consents to a Virtual/Telephone Check-In service on 10/16/24.  Patient has been referred to the [facility] website at [email or web address] to review the yearly Consent to Treat document.  Patient understands and accepts financial responsibility for any deductible, co-insurance and/or co-pays associated with this service.       Telehealth Verbal Consent   I conducted a telehealth visit with Debra Jenkins today, 10/16/24, which was completed using two-way, real-time interactive audio and video communication. This has been done in good trevor to provide continuity of care in the best interest of the provider-patient relationship, due to the COVID -19 public health crisis/national emergency where restrictions of face-to-face office visits are ongoing. Every conscious effort was taken to allow for sufficient and adequate time to complete the visit.  The patient was made aware of the limitations of the telehealth visit, including treatment limitations as no physical exam could be performed.  The patient was advised to call 911 or to go to the ER in case there was an emergency.  The patient was also advised of the potential privacy & security concerns related to the telehealth platform.   The patient was made aware of where to find [facility]'s notice of privacy practices, telehealth consent form and other related consent forms and documents.  which are located on the [facility] website. The patient verbally agreed to telehealth consent form, related consents and the risks discussed.    Lastly, the patient confirmed that they were in [place].   Included in this visit, time may have been spent reviewing labs, medications, radiology tests and decision making. Appropriate medical decision-making and tests are ordered as detailed in the plan of care above.  Coding/billing information is submitted for this visit based on complexity of care and/or time spent for the visit.    CHIEF  COMPLAINT:  Chief Complaint   Patient presents with    Rash       HPI:  Debra Jenkins is a 33 year old female who presents for a video visit.  Patient reports rash for over a week.   Rash has been persisting since onset.  Patient has not had similar rash in the past. The rash is characterized by circular red rash. The affected locations include Left side of back. Patient has treated rash with OTC clotrimazole for 6 days twice daily with minimal improvement.  Pt seen at different urgent care last week and diagnosed with ringworm and told to use OTC antifungal. Associated symptoms include: pruritus. No tenderness or pain  Exposure: no new soaps, conditioners, shampoos, detergents, lotions, or foods.  no pus,  no blood,  no discharge.      Pertinent negatives include no anorexia, congestion, cough, diarrhea, eye pain, facial edema, fatigue, fever, joint pain, rhinorrhea, swollen throat or tongue, shortness of breath, sore throat or vomiting.    Current Outpatient Medications   Medication Sig Dispense Refill    clotrimazole-betamethasone 1-0.05 % External Cream Apply 1 Application topically 2 (two) times daily as needed (for no more than 14 days). 15 g 0    ketoconazole 2 % External Cream Apply 1 Application topically daily. Until rash has resolved 30 g 0    Levonorgest-Eth Estrad 91-Day (SEASONIQUE) 0.15-0.03 &0.01 MG Oral Tab Take 1 tablet by mouth daily. 91 tablet 3    triamcinolone 0.1 % External Cream Apply topically 2 (two) times daily. (Patient not taking: Reported on 3/18/2024) 80 g 1    Meloxicam 15 MG Oral Tab Take 1 tablet (15 mg total) by mouth daily with food. (Patient not taking: Reported on 2/2/2024) 30 tablet 2    pantoprazole 20 MG Oral Tab EC Take 2 tablets (40 mg total) by mouth daily as needed. (Patient not taking: Reported on 2/2/2024) 30 tablet 2    ibuprofen 600 MG Oral Tab Take 1 tablet (600 mg total) by mouth every 6 (six) hours as needed for Pain. (Patient not taking: Reported on 2/6/2024)  60 tablet 0    escitalopram 5 MG Oral Tab Take 1 tablet (5 mg total) by mouth daily.      betamethasone 0.1 % External Cream Apply 1 Application topically 2 (two) times daily. (Patient not taking: Reported on 2/2/2024) 15 g 1    Levonorgest-Eth Estrad 91-Day (SEASONIQUE) 0.15-0.03 &0.01 MG Oral Tab Take 1 tablet by mouth daily. (Patient not taking: Reported on 2/2/2024) 91 tablet 3    escitalopram 10 MG Oral Tab Take 1 tablet (10 mg total) by mouth daily. 90 tablet 3    ALPRAZolam (XANAX) 0.25 MG Oral Tab Take 1 tablet (0.25 mg total) by mouth daily as needed for Anxiety. To take once per day as needed for anxiety. Can cause sedation/ fatigue. (Patient not taking: Reported on 2/6/2024) 30 tablet 1     Past Medical History:    Anxiety state    Depression    PONV (postoperative nausea and vomiting)    Psoriasis     Past Surgical History:   Procedure Laterality Date    Patient denies any surgical history         Social History     Socioeconomic History    Marital status:    Tobacco Use    Smoking status: Never    Smokeless tobacco: Never   Vaping Use    Vaping status: Never Used   Substance and Sexual Activity    Alcohol use: Yes     Comment: socially - 1-2 times per week.    Drug use: No    Sexual activity: Not Currently   Other Topics Concern    Blood Transfusions No    History of tanning Yes    Outdoor occupation No    Reaction to local anesthetic No    Pt has a pacemaker No    Pt has a defibrillator No        REVIEW OF SYSTEMS:  GENERAL: feels well otherwise, no fever, no chills.  SKIN: Per HPI. No edema. No ulcerations.  HEENT: Denies rhinorrhea, edema of the lips or swelling of throat.  CARDIOVASCULAR: Denies chest pains or palpitations.  LUNGS: Denies shortness of breath with exertion or rest. No cough or wheezing.  LYMPH: Denies enlargement of the lymph nodes.  NEURO: Denies abnormal sensation, tingling of the skin, or numbness.    EXAM:  General: Alert, Well-appearing, and In no acute  distress  Respiratory:   Speaking in full sentences comfortably  Normal work of breathing  No cough during visit  Head: Normocephalic  Nose: No obvious nasal discharge.  Skin: quarter sized circular lesion with raised border and minimal central clearing that is pruritic per pt. No facial swelling.     No results found for this or any previous visit (from the past 24 hours).    ASSESSMENT AND PLAN:  Debra Jenkins is a 33 year old female who presents with symptoms that are consistent with    ASSESSMENT:   Encounter Diagnosis   Name Primary?    Tinea corporis Yes       PLAN: S/s seem c/w tinea corporis. Switch to ketoconazole cream daily, pt would also like to trial combo cream with steroid. Advised no more than 2 weeks due to possible side effects of topical steroid. Meds as below.      Meds & Refills for this Visit:  Requested Prescriptions     Signed Prescriptions Disp Refills    clotrimazole-betamethasone 1-0.05 % External Cream 15 g 0     Sig: Apply 1 Application topically 2 (two) times daily as needed (for no more than 14 days).    ketoconazole 2 % External Cream 30 g 0     Sig: Apply 1 Application topically daily. Until rash has resolved       Risks, benefits, and side effects of medication explained and discussed.    There are no Patient Instructions on file for this visit.    The patient indicates understanding of these issues and agrees to the plan.  The patient is asked to f/u with PCP or DERM if sx's persist or worsen.    Debra Jenkins understands video visit evaluation is not a substitute for face-to-face examination or emergency care. Patient advised to go to ER or call 911 for worsening symptoms or acute distress.

## 2024-11-09 ENCOUNTER — OFFICE VISIT (OUTPATIENT)
Dept: INTERNAL MEDICINE CLINIC | Facility: CLINIC | Age: 34
End: 2024-11-09

## 2024-11-09 VITALS
HEART RATE: 100 BPM | WEIGHT: 239 LBS | BODY MASS INDEX: 36.22 KG/M2 | HEIGHT: 68 IN | DIASTOLIC BLOOD PRESSURE: 87 MMHG | OXYGEN SATURATION: 98 % | SYSTOLIC BLOOD PRESSURE: 127 MMHG

## 2024-11-09 DIAGNOSIS — M76.60 ACHILLES TENDON PAIN: ICD-10-CM

## 2024-11-09 DIAGNOSIS — Z23 NEED FOR VACCINATION: ICD-10-CM

## 2024-11-09 DIAGNOSIS — Z00.00 ROUTINE GENERAL MEDICAL EXAMINATION AT A HEALTH CARE FACILITY: Primary | ICD-10-CM

## 2024-11-09 DIAGNOSIS — F41.9 ANXIETY: ICD-10-CM

## 2024-11-09 DIAGNOSIS — B35.4 RINGWORM OF BODY: ICD-10-CM

## 2024-11-09 RX ORDER — ESCITALOPRAM OXALATE 10 MG/1
10 TABLET ORAL DAILY
Qty: 90 TABLET | Refills: 3 | Status: SHIPPED | OUTPATIENT
Start: 2024-11-09

## 2024-11-09 RX ORDER — ALPRAZOLAM 0.25 MG/1
0.25 TABLET ORAL
Qty: 30 TABLET | Refills: 0 | Status: SHIPPED | OUTPATIENT
Start: 2024-11-09

## 2024-11-09 RX ORDER — ESCITALOPRAM OXALATE 5 MG/1
5 TABLET ORAL DAILY
Qty: 90 TABLET | Refills: 3 | Status: SHIPPED | OUTPATIENT
Start: 2024-11-09

## 2024-11-09 NOTE — PATIENT INSTRUCTIONS
Achilles tendon brace at night  Ibuprofen 600mg twice a day with food  If not improving we can have you see podiatry

## 2024-11-09 NOTE — PROGRESS NOTES
Subjective:   Debra Jenkins is a 33 year old female who presents for Rash and Physical     Presents as new patient annual physical   She is a teacher and  in elementary school     PMHx  Anxiety - controlled on lexapro 15mg  Xanax at night occasionally   Psoriasis     Has a ringworm infection on her back and has been taking a clotrimazole-betamethasone cream but has not been consistent twice a day for the past month  Her bra strap also rubs on the area which irritates it    Saw a rheumatologist for joint pain and x-rays looked normal   Has also been experiencing pain to the left achilles tendon and heel     No smoking or drug use   Alcohol socially     Flu shot and Tdap today    OBGYN is Dr. Hastings who recommended pap smear Q5 years    History/Other:    Chief Complaint Reviewed and Verified  No Further Nursing Notes to   Review  Tobacco Reviewed  Medications Reviewed  OB Status Reviewed    Family History Reviewed         Tobacco:  She has never smoked tobacco.    Current Outpatient Medications   Medication Sig Dispense Refill    escitalopram 10 MG Oral Tab Take 1 tablet (10 mg total) by mouth daily. 90 tablet 3    escitalopram 5 MG Oral Tab Take 1 tablet (5 mg total) by mouth daily. 90 tablet 3    ALPRAZolam (XANAX) 0.25 MG Oral Tab Take 1 tablet (0.25 mg total) by mouth daily as needed for Anxiety. To take once per day as needed for anxiety. Can cause sedation/ fatigue. 30 tablet 0    clotrimazole-betamethasone 1-0.05 % External Cream Apply 1 Application topically 2 (two) times daily as needed (for no more than 14 days). 15 g 0    ketoconazole 2 % External Cream Apply 1 Application topically daily. Until rash has resolved 30 g 0    Levonorgest-Eth Estrad 91-Day (SEASONIQUE) 0.15-0.03 &0.01 MG Oral Tab Take 1 tablet by mouth daily. 91 tablet 3    triamcinolone 0.1 % External Cream Apply topically 2 (two) times daily. 80 g 1    ibuprofen 600 MG Oral Tab Take 1 tablet (600 mg total) by mouth every 6  (six) hours as needed for Pain. (Patient not taking: Reported on 2/6/2024) 60 tablet 0         Review of Systems:  Review of Systems  10 point review of systems otherwise negative with the exception of HPI and assessment and plan    Objective:   /87   Pulse 100   Ht 5' 8\" (1.727 m)   Wt 239 lb (108.4 kg)   LMP 11/03/2024 (Exact Date)   SpO2 98%   BMI 36.34 kg/m²  Estimated body mass index is 36.34 kg/m² as calculated from the following:    Height as of this encounter: 5' 8\" (1.727 m).    Weight as of this encounter: 239 lb (108.4 kg).  Physical Exam  Vitals reviewed.   Constitutional:       General: She is not in acute distress.     Appearance: Normal appearance. She is well-developed.   HENT:      Right Ear: Tympanic membrane normal.      Left Ear: Tympanic membrane normal.      Mouth/Throat:      Mouth: Mucous membranes are moist.      Pharynx: Oropharynx is clear.   Cardiovascular:      Rate and Rhythm: Normal rate and regular rhythm.      Heart sounds: Normal heart sounds.   Pulmonary:      Effort: Pulmonary effort is normal.      Breath sounds: Normal breath sounds.   Abdominal:      General: Bowel sounds are normal.      Palpations: Abdomen is soft.   Lymphadenopathy:      Cervical: No cervical adenopathy.   Skin:     General: Skin is warm and dry.             Comments: Scaly patch with underlying erythema to left thoracic back   Neurological:      Mental Status: She is alert and oriented to person, place, and time.           Assessment & Plan:   1. Routine general medical examination at a health care facility (Primary)  -     CBC With Differential With Platelet; Future; Expected date: 11/09/2024  -     Comp Metabolic Panel (14); Future; Expected date: 11/09/2024  -     Lipid Panel; Future; Expected date: 11/09/2024  -     TSH W Reflex To Free T4; Future; Expected date: 11/09/2024  2. Anxiety  -     Escitalopram Oxalate; Take 1 tablet (10 mg total) by mouth daily.  Dispense: 90 tablet; Refill: 3  -      Escitalopram Oxalate; Take 1 tablet (5 mg total) by mouth daily.  Dispense: 90 tablet; Refill: 3  -     ALPRAZolam; Take 1 tablet (0.25 mg total) by mouth daily as needed for Anxiety. To take once per day as needed for anxiety. Can cause sedation/ fatigue.  Dispense: 30 tablet; Refill: 0  3. Need for vaccination  -     TdaP (Boostrix/Adacel) Vaccine (> 7 Y)  -     Fluzone trivalent vaccine, PF 0.5mL, 6mo+ (85430)  4. Achilles tendon pain  Ibuprofen BID with food, can try achilles tendon brace  5. Ringworm of body  Consistent BID clotrimazole-betamethasone and can cover with a bandaid so her bra strap does not irritate    JAIME Marin, 11/9/2024, 11:44 AM

## 2024-11-11 ENCOUNTER — LAB ENCOUNTER (OUTPATIENT)
Dept: LAB | Age: 34
End: 2024-11-11
Payer: COMMERCIAL

## 2024-11-11 DIAGNOSIS — Z00.00 ROUTINE GENERAL MEDICAL EXAMINATION AT A HEALTH CARE FACILITY: ICD-10-CM

## 2024-11-11 LAB
ALBUMIN SERPL-MCNC: 4.5 G/DL (ref 3.2–4.8)
ALBUMIN/GLOB SERPL: 1.6 {RATIO} (ref 1–2)
ALP LIVER SERPL-CCNC: 96 U/L
ALT SERPL-CCNC: 20 U/L
ANION GAP SERPL CALC-SCNC: 6 MMOL/L (ref 0–18)
AST SERPL-CCNC: 15 U/L (ref ?–34)
BASOPHILS # BLD AUTO: 0.04 X10(3) UL (ref 0–0.2)
BASOPHILS NFR BLD AUTO: 0.4 %
BILIRUB SERPL-MCNC: 0.5 MG/DL (ref 0.3–1.2)
BUN BLD-MCNC: 11 MG/DL (ref 9–23)
BUN/CREAT SERPL: 15.1 (ref 10–20)
CALCIUM BLD-MCNC: 9.7 MG/DL (ref 8.7–10.4)
CHLORIDE SERPL-SCNC: 106 MMOL/L (ref 98–112)
CHOLEST SERPL-MCNC: 216 MG/DL (ref ?–200)
CO2 SERPL-SCNC: 27 MMOL/L (ref 21–32)
CREAT BLD-MCNC: 0.73 MG/DL
DEPRECATED RDW RBC AUTO: 38.5 FL (ref 35.1–46.3)
EGFRCR SERPLBLD CKD-EPI 2021: 109 ML/MIN/1.73M2 (ref 60–?)
EOSINOPHIL # BLD AUTO: 0.06 X10(3) UL (ref 0–0.7)
EOSINOPHIL NFR BLD AUTO: 0.7 %
ERYTHROCYTE [DISTWIDTH] IN BLOOD BY AUTOMATED COUNT: 12.1 % (ref 11–15)
FASTING PATIENT LIPID ANSWER: YES
FASTING STATUS PATIENT QL REPORTED: YES
GLOBULIN PLAS-MCNC: 2.8 G/DL (ref 2–3.5)
GLUCOSE BLD-MCNC: 86 MG/DL (ref 70–99)
HCT VFR BLD AUTO: 41.8 %
HDLC SERPL-MCNC: 54 MG/DL (ref 40–59)
HGB BLD-MCNC: 14 G/DL
IMM GRANULOCYTES # BLD AUTO: 0.03 X10(3) UL (ref 0–1)
IMM GRANULOCYTES NFR BLD: 0.3 %
LDLC SERPL CALC-MCNC: 138 MG/DL (ref ?–100)
LYMPHOCYTES # BLD AUTO: 1.97 X10(3) UL (ref 1–4)
LYMPHOCYTES NFR BLD AUTO: 22.2 %
MCH RBC QN AUTO: 28.9 PG (ref 26–34)
MCHC RBC AUTO-ENTMCNC: 33.5 G/DL (ref 31–37)
MCV RBC AUTO: 86.2 FL
MONOCYTES # BLD AUTO: 0.58 X10(3) UL (ref 0.1–1)
MONOCYTES NFR BLD AUTO: 6.5 %
NEUTROPHILS # BLD AUTO: 6.21 X10 (3) UL (ref 1.5–7.7)
NEUTROPHILS # BLD AUTO: 6.21 X10(3) UL (ref 1.5–7.7)
NEUTROPHILS NFR BLD AUTO: 69.9 %
NONHDLC SERPL-MCNC: 162 MG/DL (ref ?–130)
OSMOLALITY SERPL CALC.SUM OF ELEC: 287 MOSM/KG (ref 275–295)
PLATELET # BLD AUTO: 419 10(3)UL (ref 150–450)
POTASSIUM SERPL-SCNC: 4.4 MMOL/L (ref 3.5–5.1)
PROT SERPL-MCNC: 7.3 G/DL (ref 5.7–8.2)
RBC # BLD AUTO: 4.85 X10(6)UL
SODIUM SERPL-SCNC: 139 MMOL/L (ref 136–145)
TRIGL SERPL-MCNC: 134 MG/DL (ref 30–149)
TSI SER-ACNC: 1.21 UIU/ML (ref 0.55–4.78)
VLDLC SERPL CALC-MCNC: 25 MG/DL (ref 0–30)
WBC # BLD AUTO: 8.9 X10(3) UL (ref 4–11)

## 2024-11-11 PROCEDURE — 80053 COMPREHEN METABOLIC PANEL: CPT

## 2024-11-11 PROCEDURE — 85025 COMPLETE CBC W/AUTO DIFF WBC: CPT

## 2024-11-11 PROCEDURE — 84443 ASSAY THYROID STIM HORMONE: CPT

## 2024-11-11 PROCEDURE — 36415 COLL VENOUS BLD VENIPUNCTURE: CPT

## 2024-11-11 PROCEDURE — 80061 LIPID PANEL: CPT

## 2025-06-05 NOTE — PROGRESS NOTES
RETURN GYN OFFICE VISIT  [facility]    CHIEF COMPLAINT:    Chief Complaint   Patient presents with    Menstrual Problem     Pt states she is wondering if she has PMDD, stating symptoms happens a week before menstrual, is having some hip pain that radiates down her legs, is having a lot of blood clots, every month for the past 4 months        HISTORY OF PRESENT ILLNESS:    CASSANDRA is a 34 year old female  here for   Is a \"raging bitch\" a week before periods  Cannot control emotions - gets vehemently angry at the drop of a hat, and can't stop crying about things.  Has been tracking this for the past 4 months.    For period - it will seem like bleeding is going away, very light.  Passing blood clots and cramping intensely.  Blood clots size of nickel/quarter.  Pain in b/l hips with period - this radiates down legs.  Someteims poops at the same time as the clots. Could happen once, or could be 24 hours of it.    No bleeding between periods.    No regular sex.  No bleeding after sex, no pain.    Patient's last menstrual period was 2025 (exact date).  Monthly / regular, 7-10 days.    No changes with hair/skin/nails  No nipple discharge    Was on hormonal birth control (pills) in the past, was bad at taking it.    REVIEW OF SYSTEMS:   CONSTITUTIONAL:  negative for fevers, chills, sweats and fatigue  GASTROINTESTINAL:  negative for nausea, vomiting, blood in stool, constipation, diarrhea and abdominal pain  GENITOURINARY: negative for no dysuria, urgency or frequency; no urinary incontinence; no hematuria  SKIN:  negative for  rash, skin lesion and pruritus  ENDOCRINE:  negative for acne, fatigue, weight gain and weight loss  BEHAVIOR/PSYCH:  negative for depressed mood, anhedonia and anxiety    CURRENT MEDICATIONS:    Medications - Current[1]    PAST MEDICAL, SOCIAL AND FAMILY HISTORY:    Past Medical History[2]  Past Surgical History[3]  Family History[4]  Social Hx on file[5]        PHYSICAL EXAM:    Patient's last menstrual period was 05/26/2025 (exact date).; Body mass index is 35.43 kg/m².      CONSTITUTIONAL:  Awake, alert, cooperative, no apparent distress  EYES: sclera clear and conjunctiva normal  GASTROINTESTINAL:  soft, non-distended, non-tender, no masses palpated  GENITAL/URINARY:    External Genitalia:  General appearance; normal, Hair distribution; normal, Lesions absent   Urethral Meatus:  Lesions absent, Prolapse absent  Bladder:  Tenderness absent, Cystocele absent  Vagina:  Discharge absent, Lesions absent, Pelvic support normal  Cervix:  Lesions absent, Discharge absent, Tenderness absent  Uterus:  Size normal, Masses absent, Tenderness absent  Adnexa:  Masses absent, Tenderness absent  Anus/Perineum:  Lesions absent  SKIN:  No rashes  PSYCH:  Affect Normal      ASSESSMENT AND PLAN:  1. PMDD (premenstrual dysphoric disorder)  - reviewed this diagnosis. Options for management include hormonal medications and/or SSRI=type meds. Wants to try both bc she feels her overall mental health could benefit from daily SSRI (was using escitalopram before and stopped but thinks it was helping.)  - escitalopram 10 MG Oral Tab; Take 1 tablet (10 mg total) by mouth daily.  Dispense: 90 tablet; Refill: 3  - escitalopram 5 MG Oral Tab; Take 1 tablet (5 mg total) by mouth As Directed. Start taking 10 days before period (so full dose of 15 mg for 1-2 weeks of the month.)  Dispense: 42 tablet; Refill: 3  - Drospirenone-Ethinyl Estradiol 3-0.02 MG Oral Tab; Take 1 tablet by mouth daily. Use continuously - so skip placebo pills and start a new pack every 3 weeks.  Dispense: 112 tablet; Refill: 3    2. Anxiety  - see above    3. Dysmenorrhea  - US to eval for ovarian pathology. Suspect OCPs will treat this.  - Pelvic US Complete GYNE Only [64800/34528]; Future        Della Hastings DO       [1]   Current Outpatient Medications:     ALPRAZolam (XANAX) 0.25 MG Oral Tab, Take 1 tablet (0.25 mg total) by mouth daily as  needed for Anxiety. To take once per day as needed for anxiety. Can cause sedation/ fatigue., Disp: 30 tablet, Rfl: 0    clotrimazole-betamethasone 1-0.05 % External Cream, Apply 1 Application topically 2 (two) times daily as needed (for no more than 14 days)., Disp: 15 g, Rfl: 0    escitalopram 10 MG Oral Tab, Take 1 tablet (10 mg total) by mouth daily., Disp: 90 tablet, Rfl: 3    escitalopram 5 MG Oral Tab, Take 1 tablet (5 mg total) by mouth As Directed. Start taking 10 days before period (so full dose of 15 mg for 1-2 weeks of the month.), Disp: 42 tablet, Rfl: 3    Drospirenone-Ethinyl Estradiol 3-0.02 MG Oral Tab, Take 1 tablet by mouth daily. Use continuously - so skip placebo pills and start a new pack every 3 weeks., Disp: 112 tablet, Rfl: 3    Levonorgest-Eth Estrad 91-Day (SEASONIQUE) 0.15-0.03 &0.01 MG Oral Tab, Take 1 tablet by mouth daily. (Patient not taking: Reported on 6/5/2025), Disp: 91 tablet, Rfl: 3  [2]   Past Medical History:   Anxiety state    Depression    PONV (postoperative nausea and vomiting)    Psoriasis   [3]   Past Surgical History:  Procedure Laterality Date    Oophorectomy Left 02/2024   [4]   Family History  Problem Relation Age of Onset    Musculo-skelatal Disorder Mother     Other (perforated bowel from hernia) Mother     Other (ovarian dermoid) Mother     Arthritis Mother     Other (raynaud;s) Mother     Musculo-skelatal Disorder Father     Diabetes Father     Lipids Father     Cancer Father         skin ca    Arthritis Father     Other (fibroids) Sister     Other (fibroids) Sister     Psoriasis Maternal Grandmother         psoriatic arthritis    Heart Disease Maternal Grandfather     Blood Cancer Paternal Grandmother    [5]   Social History  Socioeconomic History    Marital status:    Tobacco Use    Smoking status: Never    Smokeless tobacco: Never   Vaping Use    Vaping status: Never Used   Substance and Sexual Activity    Alcohol use: Yes     Comment: socially - 1-2  times per week.    Drug use: No    Sexual activity: Not Currently   Other Topics Concern    Blood Transfusions No    History of tanning Yes    Outdoor occupation No    Reaction to local anesthetic No    Pt has a pacemaker No    Pt has a defibrillator No

## 2025-07-07 ENCOUNTER — ULTRASOUND ENCOUNTER (OUTPATIENT)
Dept: OBGYN CLINIC | Facility: CLINIC | Age: 35
End: 2025-07-07
Payer: COMMERCIAL

## 2025-07-07 DIAGNOSIS — N94.6 DYSMENORRHEA: ICD-10-CM

## (undated) DEVICE — [HIGH FLOW INSUFFLATOR,  DO NOT USE IF PACKAGE IS DAMAGED,  KEEP DRY,  KEEP AWAY FROM SUNLIGHT,  PROTECT FROM HEAT AND RADIOACTIVE SOURCES.]: Brand: PNEUMOSURE

## (undated) DEVICE — SUTURE VCRL SZ 0 L54IN ABSRB UD POLYGLACTIN

## (undated) DEVICE — GAMMEX® PI HYBRID SIZE 6.5, STERILE POWDER-FREE SURGICAL GLOVE, POLYISOPRENE AND NEOPRENE BLEND: Brand: GAMMEX

## (undated) DEVICE — SUTURE MCRYL SZ 4-0 L18IN ABSRB UD L19MM PS-2

## (undated) DEVICE — LAPAROSCOPIC WIRE L-HOOK ELECTRODE SOLID: Brand: VALLEYLAB

## (undated) DEVICE — STERILE POLYISOPRENE POWDER-FREE SURGICAL GLOVES WITH EMOLLIENT COATING: Brand: PROTEXIS

## (undated) DEVICE — ELECTROSURGICAL DEVICE HOLSTER;FOR USE WITH MAXIMUM PEAK VOLTAGE OF 4000 V: Brand: FORCE TRIVERSE

## (undated) DEVICE — DRAPE LAP SMS T SHP FAB REINF FLAME

## (undated) DEVICE — Device: Brand: SUTURE PASSOR PRO

## (undated) DEVICE — TROCARS: Brand: KII® OPTICAL ACCESS SYSTEM

## (undated) DEVICE — LAPAROSCOPY: Brand: MEDLINE INDUSTRIES, INC.

## (undated) DEVICE — SPONGE: SPECIALTY PEANUT XR 100/CS: Brand: MEDICAL ACTION INDUSTRIES

## (undated) DEVICE — TUBING MEGADYNE LAPAROSCOPIC

## (undated) DEVICE — ENSEAL X1 TISSUE SEALER, CURVED JAW, 37 CM SHAFT LENGTH: Brand: ENSEAL

## (undated) DEVICE — TROCAR: Brand: KII FIOS FIRST ENTRY

## (undated) DEVICE — TROCAR: Brand: KII® SLEEVE

## (undated) DEVICE — INSUFFLATION NEEDLE TO ESTABLISH PNEUMOPERITONEUM.: Brand: INSUFFLATION NEEDLE

## (undated) DEVICE — SOLUTION IRRIG 1000ML 0.9% NACL USP BTL

## (undated) DEVICE — ANCHOR TISSUE RETRIEVAL SYSTEM, BAG SIZE 1200 ML, PORT SIZE 15 MM: Brand: ANCHOR TISSUE RETRIEVAL SYSTEM

## (undated) NOTE — MR AVS SNAPSHOT
After Visit Summary   4/27/2021    Conception     MRN: [id number]           Visit Information     Date & Time  4/27/2021  4:30 PM Provider  Tanna Oneil 56, [address] [facility]  Phone  [phone] a Video Visit is currently $35.         If you receive a survey from [employer] [place], please take a few minutes to complete it and provide feedback. We strive to deliver the best patient experience and are looking for ways to make improvements.  Your feedback w EMERGENCY ROOM Life-threatening emergencies needing immediate intervention at a hospital emergency room.  Average cost  $2,300*   *Cost varies based on your insurance coverage  For more information about hours, locations or appointment options available